# Patient Record
Sex: FEMALE | Race: WHITE | NOT HISPANIC OR LATINO | URBAN - METROPOLITAN AREA
[De-identification: names, ages, dates, MRNs, and addresses within clinical notes are randomized per-mention and may not be internally consistent; named-entity substitution may affect disease eponyms.]

---

## 2017-06-13 ENCOUNTER — APPOINTMENT (RX ONLY)
Dept: URBAN - METROPOLITAN AREA CLINIC 134 | Facility: CLINIC | Age: 42
Setting detail: DERMATOLOGY
End: 2017-06-13

## 2017-06-13 DIAGNOSIS — L82.0 INFLAMED SEBORRHEIC KERATOSIS: ICD-10-CM

## 2017-06-13 PROBLEM — D23.39 OTHER BENIGN NEOPLASM OF SKIN OF OTHER PARTS OF FACE: Status: ACTIVE | Noted: 2017-06-13

## 2017-06-13 PROCEDURE — 17110 DESTRUCTION B9 LES UP TO 14: CPT

## 2017-06-13 PROCEDURE — ? LIQUID NITROGEN

## 2017-06-13 PROCEDURE — 99212 OFFICE O/P EST SF 10 MIN: CPT | Mod: 25

## 2017-06-13 ASSESSMENT — LOCATION SIMPLE DESCRIPTION DERM: LOCATION SIMPLE: LEFT FOREHEAD

## 2017-06-13 ASSESSMENT — LOCATION ZONE DERM: LOCATION ZONE: FACE

## 2017-06-13 ASSESSMENT — LOCATION DETAILED DESCRIPTION DERM: LOCATION DETAILED: LEFT LATERAL FOREHEAD

## 2017-06-13 NOTE — PROCEDURE: LIQUID NITROGEN
Render Post-Care Instructions In Note?: no
Number Of Freeze-Thaw Cycles: 3 freeze-thaw cycles
Medical Necessity Clause: This procedure was medically necessary because the lesions that were treated are a virus, contagious and can spread.
Consent: Risk of LN2 discussed to include pain, blistering, infection, scarring, pigment alteration, lack of response to TX.  Pt provides verbal consent to TX
Detail Level: Simple
Duration Of Freeze Thaw-Cycle (Seconds): 5-10
Medical Necessity Information: It is in your best interest to select a reason for this procedure from the list below. All of these items fulfill various CMS LCD requirements except the new and changing color options.

## 2024-01-08 ENCOUNTER — OFFICE VISIT (OUTPATIENT)
Dept: FAMILY MEDICINE CLINIC | Facility: CLINIC | Age: 49
End: 2024-01-08
Payer: COMMERCIAL

## 2024-01-08 VITALS
HEART RATE: 56 BPM | DIASTOLIC BLOOD PRESSURE: 86 MMHG | WEIGHT: 202 LBS | SYSTOLIC BLOOD PRESSURE: 122 MMHG | OXYGEN SATURATION: 96 % | BODY MASS INDEX: 39.66 KG/M2 | HEIGHT: 60 IN

## 2024-01-08 DIAGNOSIS — Z11.59 ENCOUNTER FOR HEPATITIS C SCREENING TEST FOR LOW RISK PATIENT: ICD-10-CM

## 2024-01-08 DIAGNOSIS — Z13.29 THYROID DISORDER SCREENING: ICD-10-CM

## 2024-01-08 DIAGNOSIS — Z13.1 DIABETES MELLITUS SCREENING: ICD-10-CM

## 2024-01-08 DIAGNOSIS — Z13.220 LIPID SCREENING: ICD-10-CM

## 2024-01-08 DIAGNOSIS — R03.0 ELEVATED BLOOD PRESSURE READING WITHOUT DIAGNOSIS OF HYPERTENSION: Primary | ICD-10-CM

## 2024-01-08 PROCEDURE — 90471 IMMUNIZATION ADMIN: CPT | Performed by: PHYSICIAN ASSISTANT

## 2024-01-08 PROCEDURE — 99203 OFFICE O/P NEW LOW 30 MIN: CPT | Performed by: PHYSICIAN ASSISTANT

## 2024-01-08 PROCEDURE — 90686 IIV4 VACC NO PRSV 0.5 ML IM: CPT | Performed by: PHYSICIAN ASSISTANT

## 2024-01-08 NOTE — PROGRESS NOTES
"Chief Complaint  Establish Care (Pt states BP has been high for couple weeks 140/100 )    Subjective          Radha Grimm presents to [facility]  History of Present Illness  Patient in today to establish care and to discuss elevated blood pressure readings that she has noted over past few weeks . Denies chest pain or shortness of breath. She states has been several years since last labs. She states has irregular menses but is  utd on gyn exam. She is due for colonoscopy- declines today but states will consider. Denies changes to bowel habits.       Objective   Vital Signs:   /86   Pulse 56   Ht 152.4 cm (60\")   Wt 91.6 kg (202 lb)   SpO2 96%   BMI 39.45 kg/m²     Body mass index is 39.45 kg/m².    Review of Systems   Constitutional:  Negative for fever.   HENT:  Negative for congestion and sore throat.    Respiratory:  Negative for cough and shortness of breath.    Cardiovascular:  Negative for chest pain.   Gastrointestinal:  Negative for abdominal pain, diarrhea, nausea and vomiting.   Genitourinary:  Negative for dysuria and frequency.   Neurological:  Negative for dizziness and headache.   Psychiatric/Behavioral:  Negative for depressed mood. The patient is not nervous/anxious.        Past History:  Medical History: has no past medical history on file.   Surgical History: has no past surgical history on file.   Family History: family history includes Hypertension in her father.   Social History: reports that she has never smoked. She has never used smokeless tobacco. She reports current alcohol use. She reports that she does not use drugs.    No current outpatient medications on file.  Allergies: Patient has no known allergies.    Physical Exam  Constitutional:       Appearance: Normal appearance.   HENT:      Right Ear: Tympanic membrane normal.      Left Ear: Tympanic membrane normal.      Mouth/Throat:      Pharynx: Oropharynx is clear.   Eyes:      " Conjunctiva/sclera: Conjunctivae normal.      Pupils: Pupils are equal, round, and reactive to light.   Cardiovascular:      Rate and Rhythm: Normal rate and regular rhythm.      Heart sounds: Normal heart sounds.   Pulmonary:      Effort: Pulmonary effort is normal.      Breath sounds: Normal breath sounds.   Abdominal:      Palpations: Abdomen is soft.      Tenderness: There is no abdominal tenderness.   Neurological:      Mental Status: She is oriented to person, place, and time.   Psychiatric:         Mood and Affect: Mood normal.         Behavior: Behavior normal.             Assessment and Plan   Diagnoses and all orders for this visit:    1. Elevated blood pressure reading without diagnosis of hypertension (Primary)  -     CBC & Differential  Encourage good hydration along with healthy diet and exercise. Recommend to keep a blood pressure log for 2 weeks, returning prior if staying elevated; she is also going to bring in her bp cuff since her home readings higher than in office  2. Encounter for hepatitis C screening test for low risk patient  -     Hepatitis C Antibody    3. Diabetes mellitus screening  -     Comprehensive Metabolic Panel  -     Hemoglobin A1c  Will check hga1c when comes back in fasting for  labs.   4. Lipid screening  -     Lipid Panel  Will check fasting lipid panel with labs.    5. Thyroid disorder screening  -     TSH  Will check TSH with labs.   Other orders  -     Cancel: CBC & Differential        I spent 30 minutes caring for Radha on this date of service. This time includes time spent by me in the following activities:obtaining and/or reviewing a separately obtained history and performing a medically appropriate examination and/or evaluation     Follow Up   No follow-ups on file.  Patient was given instructions and counseling regarding her condition or for health maintenance advice. Please see specific information pulled into the AVS if appropriate.     Aby Pena PA-C

## 2024-01-11 ENCOUNTER — LAB (OUTPATIENT)
Dept: FAMILY MEDICINE CLINIC | Facility: CLINIC | Age: 49
End: 2024-01-11
Payer: COMMERCIAL

## 2024-01-11 ENCOUNTER — CLINICAL SUPPORT (OUTPATIENT)
Dept: FAMILY MEDICINE CLINIC | Facility: CLINIC | Age: 49
End: 2024-01-11
Payer: COMMERCIAL

## 2024-01-11 DIAGNOSIS — I10 HYPERTENSION, UNSPECIFIED TYPE: Primary | ICD-10-CM

## 2024-01-11 NOTE — PROGRESS NOTES
142/92 at 8:45am manual.  144/98 on her machine.   Pt instructed to keep daily log of bp. Come back for a follow up appt in 2 weeks.  Pt instructed to calibrate her machine

## 2024-01-12 LAB
ALBUMIN SERPL-MCNC: 4.4 G/DL (ref 3.9–4.9)
ALBUMIN/GLOB SERPL: 1.9 {RATIO} (ref 1.2–2.2)
ALP SERPL-CCNC: 68 IU/L (ref 44–121)
ALT SERPL-CCNC: 12 IU/L (ref 0–32)
AST SERPL-CCNC: 18 IU/L (ref 0–40)
BILIRUB SERPL-MCNC: 0.4 MG/DL (ref 0–1.2)
BUN SERPL-MCNC: 14 MG/DL (ref 6–24)
BUN/CREAT SERPL: 18 (ref 9–23)
CALCIUM SERPL-MCNC: 9.3 MG/DL (ref 8.7–10.2)
CHLORIDE SERPL-SCNC: 103 MMOL/L (ref 96–106)
CHOLEST SERPL-MCNC: 182 MG/DL (ref 100–199)
CO2 SERPL-SCNC: 25 MMOL/L (ref 20–29)
CREAT SERPL-MCNC: 0.79 MG/DL (ref 0.57–1)
EGFRCR SERPLBLD CKD-EPI 2021: 93 ML/MIN/1.73
GLOBULIN SER CALC-MCNC: 2.3 G/DL (ref 1.5–4.5)
GLUCOSE SERPL-MCNC: 89 MG/DL (ref 70–99)
HBA1C MFR BLD: 5.6 % (ref 4.8–5.6)
HCV IGG SERPL QL IA: NON REACTIVE
HDLC SERPL-MCNC: 50 MG/DL
LDLC SERPL CALC-MCNC: 120 MG/DL (ref 0–99)
POTASSIUM SERPL-SCNC: 4.5 MMOL/L (ref 3.5–5.2)
PROT SERPL-MCNC: 6.7 G/DL (ref 6–8.5)
SODIUM SERPL-SCNC: 142 MMOL/L (ref 134–144)
TRIGL SERPL-MCNC: 63 MG/DL (ref 0–149)
TSH SERPL DL<=0.005 MIU/L-ACNC: 1.89 UIU/ML (ref 0.45–4.5)
VLDLC SERPL CALC-MCNC: 12 MG/DL (ref 5–40)

## 2024-01-22 ENCOUNTER — OFFICE VISIT (OUTPATIENT)
Dept: FAMILY MEDICINE CLINIC | Facility: CLINIC | Age: 49
End: 2024-01-22
Payer: COMMERCIAL

## 2024-01-22 VITALS
OXYGEN SATURATION: 96 % | WEIGHT: 203 LBS | DIASTOLIC BLOOD PRESSURE: 90 MMHG | HEIGHT: 60 IN | HEART RATE: 67 BPM | BODY MASS INDEX: 39.85 KG/M2 | SYSTOLIC BLOOD PRESSURE: 126 MMHG

## 2024-01-22 DIAGNOSIS — R03.0 ELEVATED BLOOD PRESSURE READING WITHOUT DIAGNOSIS OF HYPERTENSION: Primary | ICD-10-CM

## 2024-01-22 PROCEDURE — 99213 OFFICE O/P EST LOW 20 MIN: CPT | Performed by: PHYSICIAN ASSISTANT

## 2024-01-22 NOTE — PROGRESS NOTES
"Chief Complaint  Hypertension (Check up pt states BP has been running high since end of December )    Subjective          Radha Grimm presents to [facility]  History of Present Illness  Patient in today for blood pressure check. She has continued to monitor at home and has been getting elevated readings at times in 140s/low 100s--- states has been checking in early am for past several weeks. Denies chest pain or shortness of breath. She states gets exercise daily and trying to work on healthy diet.   Hypertension  This is a recurrent problem. The current episode started 1 to 4 weeks ago. The problem is unchanged. Pertinent negatives include no anxiety, blurred vision, chest pain, headaches, malaise/fatigue, orthopnea, palpitations, peripheral edema or shortness of breath. There are no associated agents to hypertension. There are no compliance problems.        Objective   Vital Signs:   /90   Pulse 67   Ht 152.4 cm (60\")   Wt 92.1 kg (203 lb)   SpO2 96%   BMI 39.65 kg/m²     Body mass index is 39.65 kg/m².    Review of Systems   Constitutional:  Negative for fever and malaise/fatigue.   Eyes:  Negative for blurred vision.   Respiratory:  Negative for shortness of breath.    Cardiovascular:  Negative for chest pain, palpitations and orthopnea.   Neurological:  Negative for dizziness.       Past History:  Medical History: has no past medical history on file.   Surgical History: has no past surgical history on file.   Family History: family history includes Hypertension in her father.   Social History: reports that she has never smoked. She has never used smokeless tobacco. She reports current alcohol use. She reports that she does not use drugs.    No current outpatient medications on file.  Allergies: Patient has no known allergies.    Physical Exam  Constitutional:       Appearance: Normal appearance.   HENT:      Right Ear: Tympanic membrane normal.      Left Ear: " Tympanic membrane normal.      Mouth/Throat:      Pharynx: Oropharynx is clear.   Eyes:      Conjunctiva/sclera: Conjunctivae normal.      Pupils: Pupils are equal, round, and reactive to light.   Cardiovascular:      Rate and Rhythm: Normal rate and regular rhythm.      Heart sounds: Normal heart sounds.   Pulmonary:      Effort: Pulmonary effort is normal.      Breath sounds: Normal breath sounds.   Abdominal:      Tenderness: There is no abdominal tenderness.   Neurological:      Mental Status: She is oriented to person, place, and time.   Psychiatric:         Mood and Affect: Mood normal.         Behavior: Behavior normal.             Assessment and Plan   Diagnoses and all orders for this visit:    1. Elevated blood pressure reading without diagnosis of hypertension (Primary)  Will have her monitor blood pressure for next week- checking it twice a day and send in blood pressure readings- if remains elevated will start on low dosage of medication; continue to work on healthy diet and exercise           Follow Up   No follow-ups on file.  Patient was given instructions and counseling regarding her condition or for health maintenance advice. Please see specific information pulled into the AVS if appropriate.     Aby Pena PA-C

## 2024-02-01 ENCOUNTER — TELEPHONE (OUTPATIENT)
Dept: FAMILY MEDICINE CLINIC | Facility: CLINIC | Age: 49
End: 2024-02-01
Payer: COMMERCIAL

## 2024-02-01 RX ORDER — NIFEDIPINE 30 MG/1
30 TABLET, EXTENDED RELEASE ORAL DAILY
Qty: 30 TABLET | Refills: 0 | Status: SHIPPED | OUTPATIENT
Start: 2024-02-01

## 2024-02-01 NOTE — TELEPHONE ENCOUNTER
Please let her know with persistent elevated blood pressure will send in low dosage of blood pressure medication called nifedipine- continue to monitor blood pressure and recheck in office in 2 weeks with readings, prior as needed; thanks

## 2024-02-01 NOTE — TELEPHONE ENCOUNTER
Pt contacted, she will get medication and keep log. I accidentally scheduled her in a physical spot at 830 so I extended her out 30 mins. If you dont want me to take that spot, let me know and I will call and reschedule her. There were no plain OV spots open that day

## 2024-02-14 ENCOUNTER — OFFICE VISIT (OUTPATIENT)
Dept: FAMILY MEDICINE CLINIC | Facility: CLINIC | Age: 49
End: 2024-02-14
Payer: COMMERCIAL

## 2024-02-14 VITALS
HEART RATE: 67 BPM | SYSTOLIC BLOOD PRESSURE: 130 MMHG | WEIGHT: 204 LBS | BODY MASS INDEX: 40.05 KG/M2 | DIASTOLIC BLOOD PRESSURE: 80 MMHG | OXYGEN SATURATION: 98 % | HEIGHT: 60 IN

## 2024-02-14 DIAGNOSIS — I10 ESSENTIAL HYPERTENSION: Primary | ICD-10-CM

## 2024-02-14 DIAGNOSIS — R06.02 SHORTNESS OF BREATH ON EXERTION: ICD-10-CM

## 2024-02-14 PROCEDURE — 99214 OFFICE O/P EST MOD 30 MIN: CPT | Performed by: PHYSICIAN ASSISTANT

## 2024-02-14 RX ORDER — NIFEDIPINE 60 MG/1
60 TABLET, EXTENDED RELEASE ORAL DAILY
Qty: 30 TABLET | Refills: 0 | Status: SHIPPED | OUTPATIENT
Start: 2024-02-14

## 2024-02-15 PROCEDURE — 93000 ELECTROCARDIOGRAM COMPLETE: CPT | Performed by: PHYSICIAN ASSISTANT

## 2024-02-20 ENCOUNTER — OFFICE VISIT (OUTPATIENT)
Dept: CARDIOLOGY | Facility: CLINIC | Age: 49
End: 2024-02-20
Payer: COMMERCIAL

## 2024-02-20 VITALS
OXYGEN SATURATION: 97 % | BODY MASS INDEX: 39.85 KG/M2 | SYSTOLIC BLOOD PRESSURE: 130 MMHG | DIASTOLIC BLOOD PRESSURE: 101 MMHG | HEIGHT: 60 IN | HEART RATE: 74 BPM | WEIGHT: 203 LBS

## 2024-02-20 DIAGNOSIS — I10 ESSENTIAL HYPERTENSION: Primary | ICD-10-CM

## 2024-02-20 DIAGNOSIS — E78.00 PRIMARY HYPERCHOLESTEROLEMIA: ICD-10-CM

## 2024-02-20 DIAGNOSIS — R00.2 PALPITATIONS: ICD-10-CM

## 2024-02-20 DIAGNOSIS — R94.31 ABNORMAL EKG: ICD-10-CM

## 2024-02-20 PROCEDURE — 93000 ELECTROCARDIOGRAM COMPLETE: CPT | Performed by: INTERNAL MEDICINE

## 2024-02-20 PROCEDURE — 99204 OFFICE O/P NEW MOD 45 MIN: CPT | Performed by: INTERNAL MEDICINE

## 2024-02-20 RX ORDER — HYDROCHLOROTHIAZIDE 12.5 MG/1
12.5 CAPSULE, GELATIN COATED ORAL DAILY
Qty: 90 CAPSULE | Refills: 3 | Status: SHIPPED | OUTPATIENT
Start: 2024-02-20

## 2024-02-20 NOTE — ASSESSMENT & PLAN NOTE
Lipid abnormalities are newly identified    Plan:  Lipid lowering therapy not prescribed due to patient age, low ASCVD score and trial of TLC first.    Discussed medication dosage, use, side effects, and goals of treatment in detail.    Counseled patient on lifestyle modifications to help control hyperlipidemia.   Cholesterol lowering dietary information shared with patient.  Patient counseled in regards to heart healthy, low fat/ low cholesterol/low sat diet, daily exercise for 30 minutes, low to moderate intensity, and weight loss.  Patient Treatment Goals:   LDL goal is under 100    Followup in 1 year.

## 2024-02-20 NOTE — ASSESSMENT & PLAN NOTE
Hypertension is uncontrolled  Medication changes per orders.  Dietary sodium restriction.  Weight loss.  Regular aerobic exercise.  Ambulatory blood pressure monitoring.  Start hydrochlorothiazide 12.5 mg daily for blood pressure control.  Patient counseled in regards to heart healthy, low fat/ low cholesterol/low sat diet, daily exercise for 30 minutes, low to moderate intensity, and weight loss.  Blood pressure will be reassessedin 4 weeks with primary care, 3 months with cardiology.

## 2024-02-20 NOTE — PROGRESS NOTES
"[facility]  [facility] CARDIOLOGY  [address] [place] [address]  Dept: [phone]  Dept Fax: [phone]    Date: 02/20/2024  Patient: Radha Grimm  YOB: 1975    New Patient Office Note    Consult Reason:  Ms. Radha Grimm is a 48 y.o. female who presents to the clinic to establish care, seen for Shortness of Breath and Hypertension.   Patient short of breath on moderate daily activities, does not have to stop the activity.  Patient denies angina, orthopnea, PND, palpitations, lightheadedness, syncope or medications side-effects..    The following portions of the patient's history were reviewed and updated as appropriate: allergies, current medications, past family history, past medical history, past social history, past surgical history, and problem list.    Medications: No Known Allergies   Current Outpatient Medications   Medication Instructions    hydroCHLOROthiazide (MICROZIDE) 12.5 mg, Oral, Daily    NIFEdipine XL (PROCARDIA XL) 60 mg, Oral, Daily       Subjective  History reviewed. No pertinent past medical history.    History reviewed. No pertinent surgical history.    Family History   Problem Relation Age of Onset    Hypertension Father         Social History     Socioeconomic History    Marital status:    Tobacco Use    Smoking status: Never    Smokeless tobacco: Never   Vaping Use    Vaping Use: Never used   Substance and Sexual Activity    Alcohol use: Yes     Comment: occ    Drug use: Never    Sexual activity: Defer       Objective  Vitals:    02/20/24 0948   BP: (!) 130/101   BP Location: Left arm   Patient Position: Sitting   Cuff Size: Adult   Pulse: 74   SpO2: 97%   Weight: 92.1 kg (203 lb)   Height: 152.4 cm (60\")   PainSc: 0-No pain     Vitals:    02/20/24 0948   BP: (!) 130/101   BP Location: Left arm   Patient Position: Sitting   Cuff Size: Adult   Pulse: 74   SpO2: 97%   Weight: 92.1 kg (203 lb)   Height: 152.4 cm (60\")    " "    Physical Exam  Constitutional:       Appearance: Healthy appearance. Not in distress.   Eyes:      Pupils: Pupils are equal, round, and reactive to light.   HENT:    Mouth/Throat:      Mouth: Mucous membranes are moist.   Neck:      Vascular: No carotid bruit, hepatojugular reflux, JVD or JVR. JVD normal.   Pulmonary:      Effort: Pulmonary effort is normal.      Breath sounds: Normal breath sounds. No wheezing. No rhonchi. No rales.   Chest:      Chest wall: Not tender to palpatation.   Cardiovascular:      PMI at left midclavicular line. Normal rate. Regular rhythm. Normal S1 with normal intensity. Normal S2 with normal intensity.       Murmurs: There is no murmur.      No gallop.  No click. No rub.   Pulses:     Carotid: 4+ bilaterally.     Radial: 4+ bilaterally.     Popliteal: 4+ bilaterally.     Dorsalis pedis: 4+ bilaterally.  Edema:     Peripheral edema absent.   Abdominal:      General: There is no abdominal bruit.   Skin:     General: Skin is warm.   Neurological:      Mental Status: Alert and oriented to person, place and time.          Labs:  Lab Results   Component Value Date     01/11/2024    K 4.5 01/11/2024     01/11/2024    CO2 25 01/11/2024    BUN 14 01/11/2024    CREATININE 0.79 01/11/2024    CALCIUM 9.3 01/11/2024    BILITOT 0.4 01/11/2024    ALKPHOS 68 01/11/2024    ALT 12 01/11/2024    AST 18 01/11/2024    GLUCOSE 89 01/11/2024    ALBUMIN 4.4 01/11/2024     No results found for: \"WBC\", \"HGB\", \"HCT\", \"PLT\"  No results found for: \"APTT\", \"INR\", \"PTT\"  No results found for: \"CKTOTAL\", \"TROPONINI\", \"TROPONINT\", \"CKMBINDEX\", \"BNP\"  No results found for: \"BNP\", \"PROBNP\"    Lab Results   Component Value Date    CHLPL 182 01/11/2024    TRIG 63 01/11/2024    HDL 50 01/11/2024     (H) 01/11/2024     Lab Results   Component Value Date    TSH 1.890 01/11/2024       The 10-year ASCVD risk score (Vlad ELDRIDGE, et al., 2019) is: 1.3%    Values used to calculate the score:      Age: 48 " years      Sex: Female      Is Non- : No      Diabetic: No      Tobacco smoker: No      Systolic Blood Pressure: 130 mmHg      Is BP treated: Yes      HDL Cholesterol: 50 mg/dL      Total Cholesterol: 182 mg/dL     [facility] Diagnostics:    ECG 12 Lead    Date/Time: 2/20/2024 9:49 AM  Performed by: Osmar Grullon MD    Authorized by: Osmar Grullon MD  Comparison: compared with previous ECG from 2/14/2024  Similar to previous ECG  Rhythm: sinus bradycardia  Other findings: left atrial abnormality          CXR: No results found for this or any previous visit.     ECHO/MUGA: No results found for this or any previous visit.     STRESS TESTS: No results found for this or any previous visit.     CARDIAC CATH: No results found for this or any previous visit.     DEVICES: No valid procedures specified.   HOLTER: No results found for this or any previous visit.     CT/MRI:  No results found for this or any previous visit.    VASCULAR: No valid procedures specified.     Assessment and Plan  Diagnoses and all orders for this visit:    1. Essential hypertension (Primary)  Assessment & Plan:  Hypertension is uncontrolled  Medication changes per orders.  Dietary sodium restriction.  Weight loss.  Regular aerobic exercise.  Ambulatory blood pressure monitoring.  Start hydrochlorothiazide 12.5 mg daily for blood pressure control.  Patient counseled in regards to heart healthy, low fat/ low cholesterol/low sat diet, daily exercise for 30 minutes, low to moderate intensity, and weight loss.  Blood pressure will be reassessedin 4 weeks with primary care, 3 months with cardiology.    Orders:  -     Adult Transthoracic Echo Complete W/ Cont if Necessary Per Protocol; Future    2. Primary hypercholesterolemia    3. Abnormal EKG  Assessment & Plan:  Left atrial enlargement on EKG.  Plan to get an echocardiogram to assess left atrial size and cardiac function in the setting of hypertension.    Orders:  -     Adult  Transthoracic Echo Complete W/ Cont if Necessary Per Protocol; Future    4. Palpitations  -     Holter Monitor - 48 Hour    Other orders  -     ECG 12 Lead  -     hydroCHLOROthiazide (MICROZIDE) 12.5 MG capsule; Take 1 capsule by mouth Daily.  Dispense: 90 capsule; Refill: 3         Return in about 3 months (around 5/20/2024) for Follow-up with Dr Grullon.    There are no Patient Instructions on file for this visit.    Osmar Grullon MD

## 2024-02-20 NOTE — ASSESSMENT & PLAN NOTE
Left atrial enlargement on EKG.  Plan to get an echocardiogram to assess left atrial size and cardiac function in the setting of hypertension.

## 2024-02-28 ENCOUNTER — PATIENT ROUNDING (BHMG ONLY) (OUTPATIENT)
Dept: CARDIOLOGY | Facility: CLINIC | Age: 49
End: 2024-02-28
Payer: COMMERCIAL

## 2024-02-28 NOTE — PROGRESS NOTES
February 28, 2024    Hello, may I speak with Radha Grimm?    My name is UNA     I am  with [facility]  [facility]OLOG[facility]  [address] [place] [address].    Before we get started may I verify your date of birth? 1975    I am calling to officially welcome you to our practice and ask about your recent visit. Is this a good time to talk? yes    Tell me about your visit with us. What things went well?  FINE        We're always looking for ways to make our patients' experiences even better. Do you have recommendations on ways we may improve?  no           I appreciate you taking the time to speak with me today. Is there anything else I can do for you? no      Thank you, and have a great day.

## 2024-02-29 ENCOUNTER — OFFICE VISIT (OUTPATIENT)
Dept: FAMILY MEDICINE CLINIC | Facility: CLINIC | Age: 49
End: 2024-02-29
Payer: COMMERCIAL

## 2024-02-29 VITALS
BODY MASS INDEX: 39.07 KG/M2 | HEIGHT: 60 IN | OXYGEN SATURATION: 97 % | WEIGHT: 199 LBS | DIASTOLIC BLOOD PRESSURE: 90 MMHG | HEART RATE: 76 BPM | SYSTOLIC BLOOD PRESSURE: 130 MMHG

## 2024-02-29 DIAGNOSIS — I10 HYPERTENSION, ESSENTIAL: Primary | ICD-10-CM

## 2024-02-29 PROCEDURE — 99214 OFFICE O/P EST MOD 30 MIN: CPT | Performed by: PHYSICIAN ASSISTANT

## 2024-02-29 RX ORDER — NIFEDIPINE 60 MG/1
60 TABLET, EXTENDED RELEASE ORAL DAILY
Qty: 90 TABLET | Refills: 1 | Status: SHIPPED | OUTPATIENT
Start: 2024-02-29

## 2024-02-29 NOTE — PROGRESS NOTES
"Chief Complaint  Hypertension (2 week follow-up)    Subjective          Radha Grimm presents to [facility]  History of Present Illness  Patient in today for recheck on blood pressure. She saw cardiology and the added in hctz 12.5 mg per day and she states her blood pressure has shown improvement. Denies chest pain or shortness of breath. She had holter monitor and is scheduled for echo tomorrow for further evaluation on EKG findings of possible left atrial enlargement. She is due for colonoscopy but wants to defer until she gets done seeing cardiology and will call to get scheduled.   Hypertension  This is a recurrent problem. The current episode started 1 to 4 weeks ago. The problem has been improved since onset. The problem is resistant. Associated symptoms include headaches and malaise/fatigue. Pertinent negatives include no anxiety, blurred vision, chest pain, orthopnea, palpitations, peripheral edema or shortness of breath. Risk factors for coronary artery disease include obesity. There are no compliance problems.        Objective   Vital Signs:   /90   Pulse 76   Ht 152.4 cm (60\")   Wt 90.3 kg (199 lb)   SpO2 97%   BMI 38.86 kg/m²     Body mass index is 38.86 kg/m².    Review of Systems   Constitutional:  Positive for malaise/fatigue.   HENT:  Negative for congestion and sore throat.    Eyes:  Negative for blurred vision.   Respiratory:  Negative for shortness of breath.    Cardiovascular:  Negative for chest pain, palpitations and orthopnea.   Gastrointestinal:  Negative for abdominal pain, diarrhea, nausea and vomiting.   Neurological:  Negative for dizziness and headache.       Past History:  Medical History: has a past medical history of Hypertension (12/23/2023) and Urinary tract infection.   Surgical History: has no past surgical history on file.   Family History: family history includes Hyperlipidemia in her father; Hypertension in her father.   Social " History: reports that she has never smoked. She has never used smokeless tobacco. She reports current alcohol use of about 1.0 standard drink of alcohol per week. She reports that she does not use drugs.      Current Outpatient Medications:     hydroCHLOROthiazide (MICROZIDE) 12.5 MG capsule, Take 1 capsule by mouth Daily., Disp: 90 capsule, Rfl: 3    NIFEdipine XL (PROCARDIA XL) 60 MG 24 hr tablet, Take 1 tablet by mouth Daily., Disp: 30 tablet, Rfl: 0  Allergies: Patient has no known allergies.    Physical Exam  Constitutional:       Appearance: Normal appearance.   HENT:      Right Ear: Tympanic membrane normal.      Left Ear: Tympanic membrane normal.      Mouth/Throat:      Pharynx: Oropharynx is clear.   Eyes:      Conjunctiva/sclera: Conjunctivae normal.      Pupils: Pupils are equal, round, and reactive to light.   Cardiovascular:      Rate and Rhythm: Normal rate and regular rhythm.      Heart sounds: Normal heart sounds.   Pulmonary:      Effort: Pulmonary effort is normal.      Breath sounds: Normal breath sounds.   Abdominal:      Tenderness: There is no abdominal tenderness.   Musculoskeletal:      Right lower leg: No edema.      Left lower leg: No edema.   Neurological:      Mental Status: She is oriented to person, place, and time.   Psychiatric:         Mood and Affect: Mood normal.         Behavior: Behavior normal.             Assessment and Plan   Diagnoses and all orders for this visit:    1. Hypertension, essential (Primary)  Blood pressure has shown improvement; encouraged to continue to monitor once a day as directed by cardiology; continue to work on healthy diet and exercise and to let us know how bp running in next few weeks, prior as needed.           Follow Up   No follow-ups on file.  Patient was given instructions and counseling regarding her condition or for health maintenance advice. Please see specific information pulled into the AVS if appropriate.     Aby Pena PA-C

## 2024-03-01 ENCOUNTER — TELEPHONE (OUTPATIENT)
Dept: CARDIOLOGY | Facility: CLINIC | Age: 49
End: 2024-03-01

## 2024-03-01 NOTE — TELEPHONE ENCOUNTER
----- Message from Osmar Grullon MD sent at 3/1/2024 10:05 AM EST -----  Please inform patient that her test was normal. Thank you!

## 2024-03-01 NOTE — TELEPHONE ENCOUNTER
informed patient that her test was abnormal.  She had 1 fast heart rate on the upper chamber 4 beats, and 1 fast heart rate from the lower chamber 3 beats, both benign in the setting of her normal echocardiogram.  They could cause palpitations, but she did not report any so likely asymptomatic.  If she felt palpitations during Holter monitoring but did not report it, we can always treat with preventative medical therapy.  If truly asymptomatic, does not need to be treated except with blood pressure control.PT verbalized understanding and had no further questions.

## 2024-03-01 NOTE — TELEPHONE ENCOUNTER
----- Message from Osmar Grullon MD sent at 3/1/2024 10:21 AM EST -----  Please inform patient that her test was abnormal.  She had 1 fast heart rate on the upper chamber 4 beats, and 1 fast heart rate from the lower chamber 3 beats, both benign in the setting of her normal echocardiogram.  They could cause palpitations, but she did not report any so likely asymptomatic.  If she felt palpitations during Holter monitoring but did not report it, we can always treat with preventative medical therapy.  If truly asymptomatic, does not need to be treated except with blood pressure control.  Thank you!

## 2024-03-04 RX ORDER — NIFEDIPINE 30 MG/1
30 TABLET, EXTENDED RELEASE ORAL DAILY
Qty: 30 TABLET | Refills: 0 | OUTPATIENT
Start: 2024-03-04

## 2024-03-25 ENCOUNTER — TELEPHONE (OUTPATIENT)
Dept: FAMILY MEDICINE CLINIC | Facility: CLINIC | Age: 49
End: 2024-03-25
Payer: COMMERCIAL

## 2024-03-25 NOTE — TELEPHONE ENCOUNTER
Recommend to keep daily log - prefer to see the lower number less than 90-- so want to make sure it gets to that range after over illness-- if staying elevated to let us know;; thanks

## 2024-05-07 ENCOUNTER — TELEPHONE (OUTPATIENT)
Dept: CARDIOLOGY | Facility: CLINIC | Age: 49
End: 2024-05-07
Payer: COMMERCIAL

## 2024-05-16 ENCOUNTER — OFFICE VISIT (OUTPATIENT)
Dept: CARDIOLOGY | Facility: CLINIC | Age: 49
End: 2024-05-16
Payer: COMMERCIAL

## 2024-05-16 VITALS
SYSTOLIC BLOOD PRESSURE: 122 MMHG | HEART RATE: 74 BPM | OXYGEN SATURATION: 97 % | BODY MASS INDEX: 39.85 KG/M2 | DIASTOLIC BLOOD PRESSURE: 70 MMHG | WEIGHT: 203 LBS | RESPIRATION RATE: 20 BRPM | TEMPERATURE: 97.3 F | HEIGHT: 60 IN

## 2024-05-16 DIAGNOSIS — R00.2 PALPITATIONS: ICD-10-CM

## 2024-05-16 DIAGNOSIS — I10 ESSENTIAL HYPERTENSION: Primary | ICD-10-CM

## 2024-05-16 DIAGNOSIS — E78.00 PRIMARY HYPERCHOLESTEROLEMIA: ICD-10-CM

## 2024-05-16 NOTE — PROGRESS NOTES
Venipuncture Blood Specimen Collection  Venipuncture performed in [facility] by Candace Nunez RN with good hemostasis. Patient tolerated the procedure well without complications.   05/16/24   Candace Nunez RN

## 2024-05-16 NOTE — ASSESSMENT & PLAN NOTE
Holter monitor showing SVT and nonsustained VT.  Workup negative with normal echocardiogram.  Blood pressure to target.  Electrolytes to goal.  Infrequent palpitations.  Patient worried about side effect of medication if needed to be treated.  Patient going on vacation with her son for the next week.  Plan:  Delay medication changes 3 weeks until patient back from vacation  Plan to decrease nifedipine to 30 mg p.o. daily for 1 week and assess blood pressure control  If blood pressure uncontrolled, plan to start low-dose verapamil while nifedipine on board; if blood pressure controlled, plan to replace nifedipine with verapamil.

## 2024-05-16 NOTE — PROGRESS NOTES
"[facility]  [facility] CARDIOLOGY  [address] [place] [address]  Dept: [phone]  Dept Fax: [phone]    Date: 05/16/2024  Patient: Radha Grimm  YOB: 1975    Follow Up Office Visit Note    Interval Follow-up  Ms. Radha Grimm is a 49 y.o. female who is here for follow-up on Hypertension and Palpitations.    Subjective   Patient recovering from a URI, felt as COVID.  Patient with shortness of breath on moderate activities, no chest pain.  Patient felt transiently worse on hydrochlorothiazide, more tired and less energetic, despite good blood pressure control.  Patient got used to the medications and now feeling better.  Infrequent palpitations.  Patient denies angina, orthopnea, PND, lightheadedness, syncope or medications side-effects.    The following portions of the patient's history were reviewed and updated as appropriate: allergies, current medications, past family history, past medical history, past social history, past surgical history, and problem list.    Medications: No Known Allergies   Current Outpatient Medications   Medication Instructions    hydroCHLOROthiazide (MICROZIDE) 12.5 mg, Oral, Daily    NIFEdipine XL (PROCARDIA XL) 60 mg, Oral, Daily       Tobacco Use: Low Risk  (5/16/2024)    Patient History     Smoking Tobacco Use: Never     Smokeless Tobacco Use: Never     Passive Exposure: Not on file        Objective  Vitals:    05/16/24 1021   BP: 122/70   Pulse: 74   Resp: 20   Temp: 97.3 °F (36.3 °C)   SpO2: 97%   Weight: 92.1 kg (203 lb)   Height: 152.4 cm (60\")   PainSc: 0-No pain      Vitals:    05/16/24 1021   BP: 122/70   Pulse: 74   Resp: 20   Temp: 97.3 °F (36.3 °C)   SpO2: 97%   Weight: 92.1 kg (203 lb)   Height: 152.4 cm (60\")          Physical Exam  Constitutional:       Appearance: Not in distress.   Neck:      Vascular: JVD normal.   Pulmonary:      Breath sounds: Normal breath sounds.   Cardiovascular:      Normal rate. " "Regular rhythm.      Murmurs: There is no murmur.   Pulses:     Intact distal pulses.   Edema:     Peripheral edema absent.   Neurological:      Mental Status: Alert.              Diagnostic Data  Lab Results   Component Value Date     01/11/2024    K 4.5 01/11/2024     01/11/2024    CO2 25 01/11/2024    BUN 14 01/11/2024    CREATININE 0.79 01/11/2024    CALCIUM 9.3 01/11/2024    BILITOT 0.4 01/11/2024    ALKPHOS 68 01/11/2024    ALT 12 01/11/2024    AST 18 01/11/2024    GLUCOSE 89 01/11/2024    ALBUMIN 4.4 01/11/2024     No results found for: \"WBC\", \"HGB\", \"HCT\", \"PLT\"  No results found for: \"APTT\", \"INR\", \"PTT\"  No results found for: \"CKTOTAL\", \"TROPONINI\", \"TROPONINT\", \"CKMBINDEX\", \"BNP\"  No results found for: \"BNP\", \"PROBNP\"    Lab Results   Component Value Date    CHLPL 182 01/11/2024    TRIG 63 01/11/2024    HDL 50 01/11/2024     (H) 01/11/2024     Lab Results   Component Value Date    TSH 1.890 01/11/2024       CV Diagnostics:  Procedures    CXR: No results found for this or any previous visit.     ECHO/MUGA: Results for orders placed in visit on 02/29/24    Adult Transthoracic Echo Complete W/ Cont if Necessary Per Protocol    Interpretation Summary    Normal LV size and vigorous function, ejection fraction estimated by 2D, 66 - 70%.    Poorly visualized aortic valve, appears to be trileaflet with mild sclerosis, no stenosis and trace regurgitation    Diastolic function normal for age     STRESS TESTS: No results found for this or any previous visit.     CARDIAC CATH: No results found for this or any previous visit.     DEVICES: No valid procedures specified.   HOLTER: Results for orders placed in visit on 02/20/24    Holter Monitor - 48 Hour    Interpretation Summary    An abnormal monitor study.    There was 1 event of atrial tachycardia 4 beats average rate 115 bpm fastest rate 140 bpm.    There was 1 event of nonsustained ventricular tachycardia 3 beats average rate 164 bpm fastest " rate 190 bpm.    No events reported by patient/no diary provided.    There was no atrial fibrillation, cardiac pauses or heart blocks detected.     CT/MRI:  No results found for this or any previous visit.    VASCULAR: No valid procedures specified.     Assessment and Plan  Diagnoses and all orders for this visit:    1. Essential hypertension (Primary)  Assessment & Plan:  Hypertension is stable and controlled  Continue current treatment regimen.  Dietary sodium restriction.  Weight loss.  Regular aerobic exercise.  Ambulatory blood pressure monitoring.  Blood pressure will be reassessed in 6 months.    Orders:  -     Cancel: Basic Metabolic Panel; Future  -     Basic Metabolic Panel    2. Primary hypercholesterolemia  Assessment & Plan:  Lipid abnormalities are newly stable.     Plan:  Lipid lowering therapy not prescribed due to patient age, low ASCVD score and trial of TLC first.     Discussed medication dosage, use, side effects, and goals of treatment in detail.    Counseled patient on lifestyle modifications to help control hyperlipidemia.   Cholesterol lowering dietary information shared with patient.  Patient counseled in regards to heart healthy, low fat/ low cholesterol/low sat diet, daily exercise for 30 minutes, low to moderate intensity, and weight loss.    Lab Results   Component Value Date    CHLPL 182 01/11/2024    TRIG 63 01/11/2024    HDL 50 01/11/2024     (H) 01/11/2024     Goal of therapy: LDL  mg/dL    Follow up lipid profile next visit      3. Palpitations  Assessment & Plan:  Holter monitor showing SVT and nonsustained VT.  Workup negative with normal echocardiogram.  Blood pressure to target.  Electrolytes to goal.  Infrequent palpitations.  Patient worried about side effect of medication if needed to be treated.  Patient going on vacation with her son for the next week.  Plan:  Delay medication changes 3 weeks until patient back from vacation  Plan to decrease nifedipine to 30  mg p.o. daily for 1 week and assess blood pressure control  If blood pressure uncontrolled, plan to start low-dose verapamil while nifedipine on board; if blood pressure controlled, plan to replace nifedipine with verapamil.           No follow-ups on file.    There are no Patient Instructions on file for this visit.    Osmar Grullon MD

## 2024-05-16 NOTE — ASSESSMENT & PLAN NOTE
Lipid abnormalities are newly stable.     Plan:  Lipid lowering therapy not prescribed due to patient age, low ASCVD score and trial of TLC first.     Discussed medication dosage, use, side effects, and goals of treatment in detail.    Counseled patient on lifestyle modifications to help control hyperlipidemia.   Cholesterol lowering dietary information shared with patient.  Patient counseled in regards to heart healthy, low fat/ low cholesterol/low sat diet, daily exercise for 30 minutes, low to moderate intensity, and weight loss.    Lab Results   Component Value Date    CHLPL 182 01/11/2024    TRIG 63 01/11/2024    HDL 50 01/11/2024     (H) 01/11/2024     Goal of therapy: LDL  mg/dL    Follow up lipid profile next visit

## 2024-05-17 LAB
BUN SERPL-MCNC: 15 MG/DL (ref 6–24)
BUN/CREAT SERPL: 20 (ref 9–23)
CALCIUM SERPL-MCNC: 9.7 MG/DL (ref 8.7–10.2)
CHLORIDE SERPL-SCNC: 99 MMOL/L (ref 96–106)
CO2 SERPL-SCNC: 23 MMOL/L (ref 20–29)
CREAT SERPL-MCNC: 0.75 MG/DL (ref 0.57–1)
EGFRCR SERPLBLD CKD-EPI 2021: 98 ML/MIN/1.73
GLUCOSE SERPL-MCNC: 89 MG/DL (ref 70–99)
POTASSIUM SERPL-SCNC: 3.8 MMOL/L (ref 3.5–5.2)
SODIUM SERPL-SCNC: 138 MMOL/L (ref 134–144)

## 2024-06-13 ENCOUNTER — TELEPHONE (OUTPATIENT)
Dept: CARDIOLOGY | Facility: CLINIC | Age: 49
End: 2024-06-13
Payer: COMMERCIAL

## 2024-06-13 RX ORDER — NIFEDIPINE 30 MG
30 TABLET, EXTENDED RELEASE ORAL DAILY
Qty: 30 TABLET | Refills: 1 | Status: SHIPPED | OUTPATIENT
Start: 2024-06-13

## 2024-06-13 NOTE — TELEPHONE ENCOUNTER
----- Message from Osmar Grullon sent at 5/16/2024 10:48 AM EDT -----  Original Date:  5/16/2024  10:48 EDT  Test needed: Check BP  Reason: Call patient to transition BP meds.

## 2024-06-13 NOTE — TELEPHONE ENCOUNTER
I discussed with patient over the phone blood pressure medications changes in order to start a nondihydropyridine calcium channel blockers for her SVTs.  Plan to discontinue nifedipine 60 mg p.o. daily, continue hydrochlorothiazide 12.5 mg daily, start nifedipine 30 mg p.o. daily and monitor blood pressure over the weekend.  If blood pressure elevated to add verapamil extended release 120 mg p.o. daily.  If blood pressure unchanged stable, we will be substituting nifedipine 30 with verapamil 120 mg p.o. daily.  Patient understands the plan and agreeable, and had no questions to ask.

## 2024-06-21 ENCOUNTER — TELEPHONE (OUTPATIENT)
Dept: CARDIOLOGY | Facility: CLINIC | Age: 49
End: 2024-06-21
Payer: COMMERCIAL

## 2024-06-21 NOTE — TELEPHONE ENCOUNTER
----- Message from Osmar Grullon sent at 6/20/2024  4:15 PM EDT -----  Can you please asked the patient to give us her blood pressure readings for the last 2 to 3 weeks?  Thank you!  ----- Message -----  From: Osmar Grullon MD  Sent: 6/6/2024  12:00 AM EDT  To: Osmar Grullon MD    Original Date:  5/16/2024  10:48 EDT  Test needed: Check BP  Reason: Call patient to transition BP meds.

## 2024-06-21 NOTE — TELEPHONE ENCOUNTER
LVM to call back. How has she been doing with medication changes? What has her pressure been running since the last time she spoke with Dr Grullon?  HUB MAY RELAY MESSAGE

## 2024-06-24 NOTE — TELEPHONE ENCOUNTER
How has she been doing with medication changes?  What has her pressure been running since the last time she spoke with Dr Grullon?         B/p 128/82 today she said she taking all medication . Pt is going to load all b/p in my chart .

## 2024-08-12 RX ORDER — NIFEDIPINE 30 MG
30 TABLET, EXTENDED RELEASE ORAL DAILY
Qty: 30 TABLET | Refills: 3 | Status: SHIPPED | OUTPATIENT
Start: 2024-08-12 | End: 2024-08-16

## 2024-08-12 RX ORDER — NIFEDIPINE 30 MG
30 TABLET, EXTENDED RELEASE ORAL DAILY
Qty: 30 TABLET | Refills: 3 | Status: SHIPPED | OUTPATIENT
Start: 2024-08-12 | End: 2024-08-12 | Stop reason: SDUPTHER

## 2024-08-16 ENCOUNTER — OFFICE VISIT (OUTPATIENT)
Dept: CARDIOLOGY | Facility: CLINIC | Age: 49
End: 2024-08-16
Payer: COMMERCIAL

## 2024-08-16 VITALS
RESPIRATION RATE: 18 BRPM | SYSTOLIC BLOOD PRESSURE: 130 MMHG | HEART RATE: 69 BPM | HEIGHT: 60 IN | OXYGEN SATURATION: 98 % | WEIGHT: 208.4 LBS | DIASTOLIC BLOOD PRESSURE: 72 MMHG | BODY MASS INDEX: 40.91 KG/M2

## 2024-08-16 DIAGNOSIS — E78.00 PRIMARY HYPERCHOLESTEROLEMIA: ICD-10-CM

## 2024-08-16 DIAGNOSIS — R00.2 PALPITATIONS: ICD-10-CM

## 2024-08-16 DIAGNOSIS — I10 ESSENTIAL HYPERTENSION: Primary | ICD-10-CM

## 2024-08-16 PROCEDURE — 93000 ELECTROCARDIOGRAM COMPLETE: CPT | Performed by: INTERNAL MEDICINE

## 2024-08-16 PROCEDURE — 99214 OFFICE O/P EST MOD 30 MIN: CPT | Performed by: INTERNAL MEDICINE

## 2024-08-16 NOTE — ASSESSMENT & PLAN NOTE
Hypertension is stable and controlled  Discontinue nifedipine and uptitrate verapamil to 180 mg p.o. daily.  Ambulatory blood pressure monitoring at home.  Dietary sodium restriction.  Weight loss.  Regular aerobic exercise.  Blood pressure will be reassessed in 6 months.

## 2024-08-16 NOTE — ASSESSMENT & PLAN NOTE
Holter monitor showing SVT and nonsustained VT.  Workup negative with normal echocardiogram.  Blood pressure to target.  Electrolytes to goal.  Infrequent palpitations, improved from prior.  BP well-controlled.  Plan:  Uptitrate verapamil to 180 mg p.o. daily   Discontinue nifedipine 30 mg p.o. and monitor blood pressure at home  If blood pressure uncontrolled, plan to start low-dose ARB   If no change in symptoms, consider repeat Holter monitor and antiarrhythmic therapy versus cardiac ablation as discussed

## 2024-08-16 NOTE — ASSESSMENT & PLAN NOTE
Lipid abnormalities are newly stable.     Plan:  Lipid lowering therapy not prescribed due to patient age, low ASCVD score and trial of TLC first.     Discussed medication dosage, use, side effects, and goals of treatment in detail.    Counseled patient on lifestyle modifications to help control hyperlipidemia.   Cholesterol lowering dietary information shared with patient.  Patient counseled in regards to heart healthy, low fat/ low cholesterol/low sat diet, daily exercise for 30 minutes, low to moderate intensity, and weight loss.           Lab Results   Component Value Date     CHLPL 182 01/11/2024     TRIG 63 01/11/2024     HDL 50 01/11/2024      (H) 01/11/2024      Goal of therapy: LDL  mg/dL     Follow up lipid profile next visit, fasting.

## 2024-08-16 NOTE — PROGRESS NOTES
"[facility]  [facility] CARDIOLOGY  [address] [place] [address]  Dept: [phone]  Dept Fax: [phone]    Date: 08/16/2024  Patient: Radha Grimm  YOB: 1975    Follow Up Office Visit Note    Interval Follow-up  Ms. Radha Grimm is a 49 y.o. female who is here for follow-up on Rapid Heart Rate and Hypertension.    Subjective   Patient overall doing better.  Still complaining of palpitations.  Patient denies angina, orthopnea, PND, lightheadedness, syncope or medications side-effects.      The following portions of the patient's history were reviewed and updated as appropriate: allergies, current medications, past family history, past medical history, past social history, past surgical history, and problem list.    Medications: No Known Allergies   Current Outpatient Medications   Medication Instructions    hydroCHLOROthiazide (MICROZIDE) 12.5 mg, Oral, Daily    verapamil SR (CALAN-SR) 180 mg, Oral, Nightly       Tobacco Use: Low Risk  (8/16/2024)    Patient History     Smoking Tobacco Use: Never     Smokeless Tobacco Use: Never     Passive Exposure: Not on file        Objective  Vitals:    08/16/24 1036   BP: 130/72   Pulse: 69   Resp: 18   SpO2: 98%   Weight: 94.5 kg (208 lb 6.4 oz)   Height: 152.4 cm (60\")   PainSc: 0-No pain      Vitals:    08/16/24 1036   BP: 130/72   Pulse: 69   Resp: 18   SpO2: 98%   Weight: 94.5 kg (208 lb 6.4 oz)   Height: 152.4 cm (60\")          Physical Exam  Constitutional:       Appearance: Not in distress.   Neck:      Vascular: JVD normal.   Pulmonary:      Breath sounds: Normal breath sounds.   Cardiovascular:      Normal rate. Regular rhythm.      Murmurs: There is no murmur.   Pulses:     Intact distal pulses.   Edema:     Peripheral edema absent.   Neurological:      Mental Status: Alert.              Diagnostic Data  Lab Results   Component Value Date     05/16/2024    K 3.8 05/16/2024    CL 99 05/16/2024    CO2 23 " "05/16/2024    BUN 15 05/16/2024    CREATININE 0.75 05/16/2024    CALCIUM 9.7 05/16/2024    BILITOT 0.4 01/11/2024    ALKPHOS 68 01/11/2024    ALT 12 01/11/2024    AST 18 01/11/2024    GLUCOSE 89 05/16/2024    ALBUMIN 4.4 01/11/2024     No results found for: \"WBC\", \"HGB\", \"HCT\", \"PLT\"  No results found for: \"APTT\", \"INR\", \"PTT\"  No results found for: \"CKTOTAL\", \"TROPONINI\", \"TROPONINT\", \"CKMBINDEX\", \"BNP\"  No results found for: \"BNP\", \"PROBNP\"    Lab Results   Component Value Date    CHLPL 182 01/11/2024    TRIG 63 01/11/2024    HDL 50 01/11/2024     (H) 01/11/2024     Lab Results   Component Value Date    TSH 1.890 01/11/2024       CV Diagnostics:    ECG 12 Lead    Date/Time: 8/16/2024 11:01 AM  Performed by: Osmar Grullon MD    Authorized by: Osmar Grullon MD  Comparison: compared with previous ECG from 2/20/2024  Comparison to previous ECG: Left atrial anomaly not identified on current EKG  New onset low voltage QRS  Rhythm: sinus bradycardia  Other findings: low voltage  Comments: Sinus bradycardia with low voltage QRS          CXR: No results found for this or any previous visit.     ECHO/MUGA: Results for orders placed in visit on 02/29/24    Adult Transthoracic Echo Complete W/ Cont if Necessary Per Protocol    Interpretation Summary    Normal LV size and vigorous function, ejection fraction estimated by 2D, 66 - 70%.    Poorly visualized aortic valve, appears to be trileaflet with mild sclerosis, no stenosis and trace regurgitation    Diastolic function normal for age     STRESS TESTS: No results found for this or any previous visit.     CARDIAC CATH: No results found for this or any previous visit.     DEVICES: No valid procedures specified.   HOLTER: Results for orders placed in visit on 02/20/24    Holter Monitor - 48 Hour    Interpretation Summary    An abnormal monitor study.    There was 1 event of atrial tachycardia 4 beats average rate 115 bpm fastest rate 140 bpm.    There was 1 event of " nonsustained ventricular tachycardia 3 beats average rate 164 bpm fastest rate 190 bpm.    No events reported by patient/no diary provided.    There was no atrial fibrillation, cardiac pauses or heart blocks detected.     CT/MRI:  No results found for this or any previous visit.    VASCULAR: No valid procedures specified.     Assessment and Plan  Diagnoses and all orders for this visit:    1. Essential hypertension (Primary)  Assessment & Plan:  Hypertension is stable and controlled  Discontinue nifedipine and uptitrate verapamil to 180 mg p.o. daily.  Ambulatory blood pressure monitoring at home.  Dietary sodium restriction.  Weight loss.  Regular aerobic exercise.  Blood pressure will be reassessed in 6 months.    Orders:  -     ECG 12 Lead    2. Primary hypercholesterolemia  Assessment & Plan:   Lipid abnormalities are newly stable.     Plan:  Lipid lowering therapy not prescribed due to patient age, low ASCVD score and trial of TLC first.     Discussed medication dosage, use, side effects, and goals of treatment in detail.    Counseled patient on lifestyle modifications to help control hyperlipidemia.   Cholesterol lowering dietary information shared with patient.  Patient counseled in regards to heart healthy, low fat/ low cholesterol/low sat diet, daily exercise for 30 minutes, low to moderate intensity, and weight loss.           Lab Results   Component Value Date     CHLPL 182 01/11/2024     TRIG 63 01/11/2024     HDL 50 01/11/2024      (H) 01/11/2024      Goal of therapy: LDL  mg/dL     Follow up lipid profile next visit, fasting.      3. Palpitations  Assessment & Plan:  Holter monitor showing SVT and nonsustained VT.  Workup negative with normal echocardiogram.  Blood pressure to target.  Electrolytes to goal.  Infrequent palpitations, improved from prior.  BP well-controlled.  Plan:  Uptitrate verapamil to 180 mg p.o. daily   Discontinue nifedipine 30 mg p.o. and monitor blood pressure at  home  If blood pressure uncontrolled, plan to start low-dose ARB   If no change in symptoms, consider repeat Holter monitor and antiarrhythmic therapy versus cardiac ablation as discussed    Orders:  -     verapamil SR (CALAN-SR) 180 MG CR tablet; Take 1 tablet by mouth Every Night.  Dispense: 90 tablet; Refill: 3         Return in about 3 months (around 11/16/2024) for Follow-up with Dr Grullon.    There are no Patient Instructions on file for this visit.    Osmar Grullon MD

## 2024-11-15 ENCOUNTER — OFFICE VISIT (OUTPATIENT)
Dept: CARDIOLOGY | Facility: CLINIC | Age: 49
End: 2024-11-15
Payer: COMMERCIAL

## 2024-11-15 VITALS
HEART RATE: 52 BPM | DIASTOLIC BLOOD PRESSURE: 74 MMHG | RESPIRATION RATE: 16 BRPM | HEIGHT: 60 IN | WEIGHT: 214 LBS | BODY MASS INDEX: 42.01 KG/M2 | OXYGEN SATURATION: 97 % | SYSTOLIC BLOOD PRESSURE: 118 MMHG

## 2024-11-15 DIAGNOSIS — I10 ESSENTIAL HYPERTENSION: ICD-10-CM

## 2024-11-15 DIAGNOSIS — R00.2 PALPITATIONS: Primary | ICD-10-CM

## 2024-11-15 DIAGNOSIS — E78.00 PRIMARY HYPERCHOLESTEROLEMIA: ICD-10-CM

## 2024-11-15 PROCEDURE — 99214 OFFICE O/P EST MOD 30 MIN: CPT | Performed by: INTERNAL MEDICINE

## 2024-11-15 NOTE — ASSESSMENT & PLAN NOTE
Lipid abnormalities are newly stable.     Plan:  Lipid lowering therapy not prescribed due to patient age, low ASCVD score and trial of TLC first.     Discussed medication dosage, use, side effects, and goals of treatment in detail.    Counseled patient on lifestyle modifications to help control hyperlipidemia.   Cholesterol lowering dietary information shared with patient.  Patient counseled in regards to heart healthy, low fat/ low cholesterol/low sat diet, daily exercise for 30 minutes, low to moderate intensity, and weight loss.               Lab Results   Component Value Date     CHLPL 182 01/11/2024     TRIG 63 01/11/2024     HDL 50 01/11/2024      (H) 01/11/2024      Goal of therapy: LDL  mg/dL     Follow up lipid profile prior to next visit, fasting as discussed with patient, at PCP office or walk-in at our cardiology office.

## 2024-11-15 NOTE — PROGRESS NOTES
"[facility]  [facility] CARDIOLOGY  [address] [place] [address]  Dept: [phone]  Dept Fax: [phone]    Date: 11/15/2024  Patient: Radha Grimm  YOB: 1975    Follow Up Office Visit Note    Interval Follow-up  Ms. Radha Grimm is a 49 y.o. female who is here for follow-up on essential hypertension.    Subjective   Patient doing well with no complaints.  Patient denies angina, orthopnea, PND, palpitations, lightheadedness, syncope or medications side-effects.      The following portions of the patient's history were reviewed and updated as appropriate: allergies, current medications, past family history, past medical history, past social history, past surgical history, and problem list.    Medications: No Known Allergies   Current Outpatient Medications   Medication Instructions    hydroCHLOROthiazide (MICROZIDE) 12.5 mg, Oral, Daily    verapamil SR (CALAN-SR) 180 mg, Oral, Nightly       Tobacco Use: Low Risk  (11/15/2024)    Patient History     Smoking Tobacco Use: Never     Smokeless Tobacco Use: Never     Passive Exposure: Not on file        Objective  Vitals:    11/15/24 1033   BP: 118/74   Pulse: 52   Resp: 16   SpO2: 97%   Weight: 97.1 kg (214 lb)   Height: 152.4 cm (60\")   PainSc: 0-No pain      Vitals:    11/15/24 1033   BP: 118/74   Pulse: 52   Resp: 16   SpO2: 97%   Weight: 97.1 kg (214 lb)   Height: 152.4 cm (60\")          Physical Exam  Constitutional:       Appearance: Not in distress.   Neck:      Vascular: JVD normal.   Pulmonary:      Breath sounds: Normal breath sounds.   Cardiovascular:      Normal rate. Regular rhythm.      Murmurs: There is no murmur.   Pulses:     Intact distal pulses.   Edema:     Peripheral edema absent.   Neurological:      Mental Status: Alert.              Diagnostic Data  Lab Results   Component Value Date     05/16/2024    K 3.8 05/16/2024    CL 99 05/16/2024    CO2 23 05/16/2024    BUN 15 05/16/2024    " "CREATININE 0.75 05/16/2024    CALCIUM 9.7 05/16/2024    BILITOT 0.4 01/11/2024    ALKPHOS 68 01/11/2024    ALT 12 01/11/2024    AST 18 01/11/2024    GLUCOSE 89 05/16/2024    ALBUMIN 4.4 01/11/2024     No results found for: \"WBC\", \"HGB\", \"HCT\", \"PLT\"  No results found for: \"APTT\", \"INR\", \"PTT\"  No results found for: \"CKTOTAL\", \"TROPONINI\", \"TROPONINT\", \"CKMBINDEX\", \"BNP\"  No results found for: \"BNP\", \"PROBNP\"    Lab Results   Component Value Date    CHLPL 182 01/11/2024    TRIG 63 01/11/2024    HDL 50 01/11/2024     (H) 01/11/2024     Lab Results   Component Value Date    TSH 1.890 01/11/2024       CV Diagnostics:  Procedures    CXR: No results found for this or any previous visit.     ECHO/MUGA: Results for orders placed in visit on 02/29/24    Adult Transthoracic Echo Complete W/ Cont if Necessary Per Protocol    Interpretation Summary    Normal LV size and vigorous function, ejection fraction estimated by 2D, 66 - 70%.    Poorly visualized aortic valve, appears to be trileaflet with mild sclerosis, no stenosis and trace regurgitation    Diastolic function normal for age     STRESS TESTS: No results found for this or any previous visit.     CARDIAC CATH: No results found for this or any previous visit.     DEVICES: No valid procedures specified.   HOLTER: Results for orders placed in visit on 02/20/24    Holter Monitor - 48 Hour    Interpretation Summary    An abnormal monitor study.    There was 1 event of atrial tachycardia 4 beats average rate 115 bpm fastest rate 140 bpm.    There was 1 event of nonsustained ventricular tachycardia 3 beats average rate 164 bpm fastest rate 190 bpm.    No events reported by patient/no diary provided.    There was no atrial fibrillation, cardiac pauses or heart blocks detected.     CT/MRI:  No results found for this or any previous visit.    VASCULAR: No valid procedures specified.     Assessment and Plan  Diagnoses and all orders for this visit:    1. Palpitations " (Primary)  Assessment & Plan:  Holter monitor showing SVT and nonsustained VT.  Workup negative with normal echocardiogram.  Blood pressure to target.  Electrolytes to goal.  Resolution on higher dose of verapamil.  BP well-controlled.  Plan:  Continue verapamil to 180 mg p.o. daily  Follow-up clinically      2. Essential hypertension  Assessment & Plan:  Hypertension is stable and controlled  Continue current treatment regimen.  Regular aerobic exercise.  Ambulatory blood pressure monitoring.  Blood pressure will be reassessed in 1 year.      3. Primary hypercholesterolemia  Assessment & Plan:  Lipid abnormalities are newly stable.     Plan:  Lipid lowering therapy not prescribed due to patient age, low ASCVD score and trial of TLC first.     Discussed medication dosage, use, side effects, and goals of treatment in detail.    Counseled patient on lifestyle modifications to help control hyperlipidemia.   Cholesterol lowering dietary information shared with patient.  Patient counseled in regards to heart healthy, low fat/ low cholesterol/low sat diet, daily exercise for 30 minutes, low to moderate intensity, and weight loss.               Lab Results   Component Value Date     CHLPL 182 01/11/2024     TRIG 63 01/11/2024     HDL 50 01/11/2024      (H) 01/11/2024      Goal of therapy: LDL  mg/dL     Follow up lipid profile prior to next visit, fasting as discussed with patient, at PCP office or walk-in at our cardiology office.           Return in about 1 year (around 11/15/2025) for Follow-up with Dr Grullon.    There are no Patient Instructions on file for this visit.    Osmar Grullon MD

## 2024-11-15 NOTE — ASSESSMENT & PLAN NOTE
Holter monitor showing SVT and nonsustained VT.  Workup negative with normal echocardiogram.  Blood pressure to target.  Electrolytes to goal.  Resolution on higher dose of verapamil.  BP well-controlled.  Plan:  Continue verapamil to 180 mg p.o. daily  Follow-up clinically

## 2024-11-15 NOTE — ASSESSMENT & PLAN NOTE
Hypertension is stable and controlled  Continue current treatment regimen.  Regular aerobic exercise.  Ambulatory blood pressure monitoring.  Blood pressure will be reassessed in 1 year.

## 2024-12-23 ENCOUNTER — OFFICE VISIT (OUTPATIENT)
Dept: FAMILY MEDICINE CLINIC | Facility: CLINIC | Age: 49
End: 2024-12-23
Payer: COMMERCIAL

## 2024-12-23 VITALS
HEIGHT: 60 IN | RESPIRATION RATE: 16 BRPM | SYSTOLIC BLOOD PRESSURE: 118 MMHG | DIASTOLIC BLOOD PRESSURE: 74 MMHG | HEART RATE: 60 BPM | BODY MASS INDEX: 42.6 KG/M2 | OXYGEN SATURATION: 96 % | WEIGHT: 217 LBS

## 2024-12-23 DIAGNOSIS — Z00.00 ROUTINE PHYSICAL EXAMINATION: Primary | ICD-10-CM

## 2024-12-23 DIAGNOSIS — I10 HYPERTENSION, ESSENTIAL: ICD-10-CM

## 2024-12-23 DIAGNOSIS — Z13.1 DIABETES MELLITUS SCREENING: ICD-10-CM

## 2024-12-23 DIAGNOSIS — Z13.220 LIPID SCREENING: ICD-10-CM

## 2024-12-23 DIAGNOSIS — Z12.31 ENCOUNTER FOR SCREENING MAMMOGRAM FOR MALIGNANT NEOPLASM OF BREAST: ICD-10-CM

## 2024-12-23 DIAGNOSIS — Z12.11 COLON CANCER SCREENING: ICD-10-CM

## 2024-12-23 PROCEDURE — 99396 PREV VISIT EST AGE 40-64: CPT | Performed by: PHYSICIAN ASSISTANT

## 2024-12-23 NOTE — PROGRESS NOTES
"Chief Complaint  Annual Exam    Subjective          Radha Grimm presents to [facility]  History of Present Illness  Patient in today for routine physical exam and fasting labs.  She states she has been feeling well.  Denies any chest pain or shortness of breath.  She is interested in getting a referral to weight management to help with weight loss.  She states she has been trying to work on healthier diet and exercise.  Her blood pressure has been doing well.  She follows with cardiology annually.  She states she is due for mammogram and would like to get this scheduled.  She is also due for colon cancer screening.  She declines a colonoscopy, but would like to do the Cologuard.  Denies any family history of colon cancer or blood in the stool.  She states she is up-to-date on her eye and dental exams.      Objective   Vital Signs:   /74 (BP Location: Left arm, Patient Position: Sitting, Cuff Size: Adult)   Pulse 60   Resp 16   Ht 152.4 cm (60\")   Wt 98.4 kg (217 lb)   SpO2 96%   BMI 42.38 kg/m²     Body mass index is 42.38 kg/m².    Review of Systems   Constitutional:  Negative for fatigue and fever.   HENT:  Negative for congestion and sore throat.    Respiratory:  Negative for cough and shortness of breath.    Cardiovascular:  Negative for chest pain.   Gastrointestinal:  Negative for abdominal pain, diarrhea, nausea and vomiting.   Genitourinary:  Negative for dysuria and frequency.   Neurological:  Negative for dizziness and headache.   Psychiatric/Behavioral:  Negative for depressed mood. The patient is not nervous/anxious.        Past History:  Medical History: has a past medical history of Hypertension (12/23/2023) and Urinary tract infection.   Surgical History: has no past surgical history on file.   Family History: family history includes Cancer in her mother; Hyperlipidemia in her father; Hypertension in her father.   Social History: reports that she has never " smoked. She has never used smokeless tobacco. She reports current alcohol use of about 1.0 standard drink of alcohol per week. She reports that she does not use drugs.      Current Outpatient Medications:     hydroCHLOROthiazide (MICROZIDE) 12.5 MG capsule, Take 1 capsule by mouth Daily., Disp: 90 capsule, Rfl: 3    verapamil SR (CALAN-SR) 180 MG CR tablet, Take 1 tablet by mouth Every Night., Disp: 90 tablet, Rfl: 3  Allergies: Patient has no known allergies.    Physical Exam  Constitutional:       Appearance: Normal appearance.   HENT:      Right Ear: Tympanic membrane normal.      Left Ear: Tympanic membrane normal.      Mouth/Throat:      Pharynx: Oropharynx is clear.   Eyes:      Conjunctiva/sclera: Conjunctivae normal.      Pupils: Pupils are equal, round, and reactive to light.   Cardiovascular:      Rate and Rhythm: Normal rate and regular rhythm.      Heart sounds: Normal heart sounds.   Pulmonary:      Effort: Pulmonary effort is normal.      Breath sounds: Normal breath sounds.   Abdominal:      Palpations: Abdomen is soft.      Tenderness: There is no abdominal tenderness.   Neurological:      Mental Status: She is oriented to person, place, and time.   Psychiatric:         Mood and Affect: Mood normal.         Behavior: Behavior normal.             Assessment and Plan   Diagnoses and all orders for this visit:    1. Routine physical examination (Primary)  Encouraged healthy diet and exercise. Fasting labs today.   2. Hypertension, essential  -     CBC & Differential  -     Comprehensive Metabolic Panel  -     TSH  Continue current medication and f/up with cardiology as directed.   3. Encounter for screening mammogram for malignant neoplasm of breast  -     Mammo Screening Digital Tomosynthesis Bilateral With CAD; Future  Will set up for mammogram.   4. Lipid screening  -     Lipid Panel  Fasting lipid panel with labs.   5. Colon cancer screening  -     Cologuard - Stool, Per Rectum; Future  Will order  anupam.   6. Diabetes mellitus screening  -     Hemoglobin A1c  Will check hga1c with labs.   7. BMI 40.0-44.9, adult  -     Ambulatory Referral to Weight Management Program  Will  put in referral for weight  management program; encourage healthy diet and exercise           Follow Up   No follow-ups on file.  Patient was given instructions and counseling regarding her condition or for health maintenance advice. Please see specific information pulled into the AVS if appropriate.     COLT AnthonyC

## 2024-12-24 LAB
ALBUMIN SERPL-MCNC: 4.4 G/DL (ref 3.9–4.9)
ALP SERPL-CCNC: 81 IU/L (ref 44–121)
ALT SERPL-CCNC: 14 IU/L (ref 0–32)
AST SERPL-CCNC: 18 IU/L (ref 0–40)
BASOPHILS # BLD AUTO: 0 X10E3/UL (ref 0–0.2)
BASOPHILS NFR BLD AUTO: 0 %
BILIRUB SERPL-MCNC: 0.5 MG/DL (ref 0–1.2)
BUN SERPL-MCNC: 18 MG/DL (ref 6–24)
BUN/CREAT SERPL: 23 (ref 9–23)
CALCIUM SERPL-MCNC: 9.3 MG/DL (ref 8.7–10.2)
CHLORIDE SERPL-SCNC: 102 MMOL/L (ref 96–106)
CHOLEST SERPL-MCNC: 207 MG/DL (ref 100–199)
CO2 SERPL-SCNC: 23 MMOL/L (ref 20–29)
CREAT SERPL-MCNC: 0.79 MG/DL (ref 0.57–1)
EGFRCR SERPLBLD CKD-EPI 2021: 92 ML/MIN/1.73
EOSINOPHIL # BLD AUTO: 0.3 X10E3/UL (ref 0–0.4)
EOSINOPHIL NFR BLD AUTO: 4 %
ERYTHROCYTE [DISTWIDTH] IN BLOOD BY AUTOMATED COUNT: 12.5 % (ref 11.7–15.4)
GLOBULIN SER CALC-MCNC: 2.6 G/DL (ref 1.5–4.5)
GLUCOSE SERPL-MCNC: 91 MG/DL (ref 70–99)
HBA1C MFR BLD: 5.8 % (ref 4.8–5.6)
HCT VFR BLD AUTO: 42.4 % (ref 34–46.6)
HDLC SERPL-MCNC: 64 MG/DL
HGB BLD-MCNC: 14.3 G/DL (ref 11.1–15.9)
IMM GRANULOCYTES # BLD AUTO: 0 X10E3/UL (ref 0–0.1)
IMM GRANULOCYTES NFR BLD AUTO: 0 %
LDLC SERPL CALC-MCNC: 128 MG/DL (ref 0–99)
LYMPHOCYTES # BLD AUTO: 2.4 X10E3/UL (ref 0.7–3.1)
LYMPHOCYTES NFR BLD AUTO: 32 %
MCH RBC QN AUTO: 32.1 PG (ref 26.6–33)
MCHC RBC AUTO-ENTMCNC: 33.7 G/DL (ref 31.5–35.7)
MCV RBC AUTO: 95 FL (ref 79–97)
MONOCYTES # BLD AUTO: 0.6 X10E3/UL (ref 0.1–0.9)
MONOCYTES NFR BLD AUTO: 8 %
NEUTROPHILS # BLD AUTO: 4.1 X10E3/UL (ref 1.4–7)
NEUTROPHILS NFR BLD AUTO: 56 %
PLATELET # BLD AUTO: 267 X10E3/UL (ref 150–450)
POTASSIUM SERPL-SCNC: 3.9 MMOL/L (ref 3.5–5.2)
PROT SERPL-MCNC: 7 G/DL (ref 6–8.5)
RBC # BLD AUTO: 4.45 X10E6/UL (ref 3.77–5.28)
SODIUM SERPL-SCNC: 141 MMOL/L (ref 134–144)
TRIGL SERPL-MCNC: 86 MG/DL (ref 0–149)
TSH SERPL DL<=0.005 MIU/L-ACNC: 1.8 UIU/ML (ref 0.45–4.5)
VLDLC SERPL CALC-MCNC: 15 MG/DL (ref 5–40)
WBC # BLD AUTO: 7.4 X10E3/UL (ref 3.4–10.8)

## 2024-12-26 ENCOUNTER — TELEPHONE (OUTPATIENT)
Dept: FAMILY MEDICINE CLINIC | Facility: CLINIC | Age: 49
End: 2024-12-26
Payer: COMMERCIAL

## 2024-12-26 NOTE — TELEPHONE ENCOUNTER
LVM for pt to call us back for results    Hub to relay:  Please let her know her labs showed her LDL cholesterol at 128, nml < 100  and 3 month sugar avg in prediabetic range at 5.8--- please work on healthy diet and exercise to help keep these low ; blood count and thyroid test were normal; thanks

## 2024-12-27 NOTE — TELEPHONE ENCOUNTER
Name: Radha Grimm      Relationship: Self      Best Callback Number: [phone]       HUB PROVIDED THE RELAY MESSAGE FROM THE OFFICE      PATIENT: VOICED UNDERSTANDING AND HAS NO FURTHER QUESTIONS AT THIS TIME    ADDITIONAL INFORMATION:

## 2025-02-04 ENCOUNTER — TELEPHONE (OUTPATIENT)
Dept: FAMILY MEDICINE CLINIC | Facility: CLINIC | Age: 50
End: 2025-02-04
Payer: COMMERCIAL

## 2025-02-04 DIAGNOSIS — N63.10 MASS OF RIGHT BREAST, UNSPECIFIED QUADRANT: ICD-10-CM

## 2025-02-04 DIAGNOSIS — N64.89 BREAST ASYMMETRY: Primary | ICD-10-CM

## 2025-02-04 NOTE — TELEPHONE ENCOUNTER
Called and spoke with patient and discussed mammogram findings- recommendation for diagnostic mammogram and ultrasound- have put in order for patient to be scheduled

## 2025-02-11 DIAGNOSIS — N63.10 MASS OF RIGHT BREAST, UNSPECIFIED QUADRANT: Primary | ICD-10-CM

## 2025-02-18 ENCOUNTER — OFFICE VISIT (OUTPATIENT)
Age: 50
End: 2025-02-18
Payer: COMMERCIAL

## 2025-02-18 VITALS
HEIGHT: 61 IN | HEART RATE: 77 BPM | BODY MASS INDEX: 40.55 KG/M2 | SYSTOLIC BLOOD PRESSURE: 126 MMHG | DIASTOLIC BLOOD PRESSURE: 86 MMHG | WEIGHT: 214.8 LBS

## 2025-02-18 DIAGNOSIS — E66.01 OBESITY, CLASS III, BMI 40-49.9 (MORBID OBESITY): Primary | ICD-10-CM

## 2025-02-18 DIAGNOSIS — Z79.899 MEDICATION MANAGEMENT: ICD-10-CM

## 2025-02-18 PROBLEM — E66.813 OBESITY, CLASS III, BMI 40-49.9 (MORBID OBESITY): Status: ACTIVE | Noted: 2025-02-18

## 2025-02-18 LAB
BUPRENORPHINE SERPL-MCNC: NEGATIVE NG/ML
MDMA UR QL SCN: NEGATIVE
POC AMPHETAMINES: NEGATIVE
POC BARBITURATES: NEGATIVE
POC BENZODIAZEPHINES: NEGATIVE
POC COCAINE: NEGATIVE
POC METHADONE: NEGATIVE
POC METHAMPHETAMINE SCREEN URINE: NEGATIVE
POC OPIATES: NEGATIVE
POC OXYCODONE: NEGATIVE
POC PHENCYCLIDINE: NEGATIVE
POC THC: NEGATIVE

## 2025-02-18 RX ORDER — PHENTERMINE HYDROCHLORIDE 37.5 MG/1
37.5 TABLET ORAL
Qty: 30 TABLET | Refills: 0 | Status: SHIPPED | OUTPATIENT
Start: 2025-02-18

## 2025-02-18 RX ORDER — LANOLIN ALCOHOL/MO/W.PET/CERES
1000 CREAM (GRAM) TOPICAL DAILY
COMMUNITY

## 2025-02-18 NOTE — ASSESSMENT & PLAN NOTE
Patient's (Body mass index is 41.26 kg/m².) indicates that they are morbidly/severely obese (BMI > 40 or > 35 with obesity - related health condition) with health conditions that include hypertension and dyslipidemias . Weight is newly identified. BMI  is above average; BMI management plan is completed. We discussed low calorie, low carb based diet program, portion control, increasing exercise, and an an-based approach such as [employer] Pal or Lose It.       -- This is an initial visit. Topics of discussion included obesity as a disease, nutritional education on food groups, exercise, and medications.Patient's past medical history was reviewed in detail and barriers to weight loss were identified and discussed. Past efforts at weight reduction on their own as well as under physician supervision were documented and discussed.  I advised patient to continue routine care with their Primary Care Provider.  --Body composition analysis was reviewed. Short and long-term weight loss goals set up based on this information.  --Patient was instructed on adequate protein, controlled carb and controlled fat intake. Baritastic food journal set up with calorie and macro goals set.  Patient encouraged to start journaling daily.  Encouraged that we are not necessarily looking for perfection but starting to learn where calories and macros are staying.  Patient advised that were looking to stay at or below calorie and carbohydrate levels and at or above protein and fiber levels. Asked patient to bring in food journal to next office visit for brief review  --Patient is to try nutritonal/behavioral changes only first.  --Fasting labs reviewed from December  --Discussed medication management options for weight loss including stimulants (phentermine or diethylpropion), Contrave, and GLP-1. Mechanism of action, adverse and side effects, and benefits of each class reviewed.   --Phentermine 37.5 mg daily. Dosing instructions, adverse  effects and side effects discussed with patient (GLORIA and KRISS reviewed)

## 2025-02-18 NOTE — PROGRESS NOTES
[facility]  [facility]  [address] [place], [place] [address] [place] [address]      Date: 2025  Patient Name: Radha Grimm  MRN: [id number]  : 1975    Subjective     Chief Complaint  Obesity Management consult, nutrition counseling       History of Present Illness:  Radha Grimm presents to [facility] for obesity management. Personal goal is to lose 70 pounds to look and feel healthy. Her weight has been up and down throughout her lifetime. About two years ago she was at her healthiest which she accomplished with diet and strength training.     Weight history:  Highest lifetime weight: 233 pounds. Today's weight is 97.4 kg (214 lb 12.8 oz) pounds.   Weight 5 years ago: 200+    Current lifestyle:   The following seem to sabotage weight loss efforts:comfort/stress eating, enjoyment of food, liquid calories such as alcohol, mindless eating (snacking while working or watching TV), and boredom eating    Past diets: weight watchers  Past weight loss medications: none    Typical Diet:  Breakfast: eggs, hashbrowns or breakfast burrito  Lunch: salad on work days  Dinner: [employer]als or Mexican or other restaurant   Snacks: Vegetables, crackers or chips  Beverages: water (24 oz/day), unsweet tea (32 oz/day), sports drinks (16 oz/day)    Pertinent medical history:  Pancreatitis: no  Glaucoma: no  Headaches: no  HTN: yes, controlled on meds  Heart palpitations: yes, induced by medication. Resolved since stopping med.  Thyroid C cell cancer personal or family hx: no  MEN syndrome personal or family hx: no  Nephrolithiasis: no    PHQ-9 Total Score:   Little interest or pleasure in doing things? Not at all   Feeling down, depressed, or hopeless? Not at all   PHQ-2 Total Score 0              Review of Systems   Constitutional:  Positive for fatigue and unexpected weight gain.        Positive for weight gain   HENT:  Negative for trouble swallowing.          "Negative for throat swelling   Respiratory:  Negative for shortness of breath and wheezing.         Negative for snoring   Cardiovascular:  Negative for chest pain, palpitations and leg swelling.   Gastrointestinal:  Negative for abdominal pain, constipation, diarrhea, GERD and indigestion.   Endocrine: Negative for cold intolerance, heat intolerance, polydipsia, polyphagia and polyuria.        Negative for loss of hair  Negative for hirsutism     Genitourinary:         Denies menstrual irregularities   Musculoskeletal:  Negative for arthralgias.        Denies exercise limitations  Denies chronic pain   Skin:  Positive for dry skin.        Negative for acne   Neurological:  Negative for headache and memory problem.        Negative for paresthesias   Psychiatric/Behavioral:  Negative for self-injury, sleep disturbance, suicidal ideas and depressed mood. The patient is not nervous/anxious.    All other systems reviewed and are negative.        Objective     Body mass index is 41.26 kg/m².   Body composition analysis completed and showed:   Body Fat %: 46.2     Measurements (in inches)  Measurements (in inches) Waist Circumference: 40   Neck: 14   Chest: 42.5  Hips: 47.5  Thighs: 45    Vital Signs:   /86 (BP Location: Left arm, Patient Position: Sitting)   Pulse 77   Ht 153.7 cm (60.5\")   Wt 97.4 kg (214 lb 12.8 oz)   BMI 41.26 kg/m²     Physical Exam  Constitutional:       Appearance: Normal appearance.   HENT:      Head: Normocephalic and atraumatic.   Pulmonary:      Effort: Pulmonary effort is normal.   Neurological:      Mental Status: She is alert and oriented to person, place, and time.   Psychiatric:         Mood and Affect: Mood normal.         Thought Content: Thought content normal.          Result Review :     Common labs          5/16/2024    10:51 12/23/2024    10:35   Common Labs   Glucose 89  91    BUN 15  18    Creatinine 0.75  0.79    Sodium 138  141    Potassium 3.8  3.9    Chloride 99  102 "    Calcium 9.7  9.3    Albumin  4.4    Total Bilirubin  0.5    Alkaline Phosphatase  81    AST (SGOT)  18    ALT (SGPT)  14    WBC  7.4    Hemoglobin  14.3    Hematocrit  42.4    Platelets  267    Total Cholesterol  207    Triglycerides  86    HDL Cholesterol  64    LDL Cholesterol   128    Hemoglobin A1C  5.8             Component  Ref Range & Units 15:06   Methamphetaine Screen, Urine  Negative Negative   POC Amphetamines  Negative Negative   Barbiturates Screen  Negative Negative   Benzodiazepine Screen  Negative Negative   Cocaine Screen  Negative Negative   Methadone Screen  Negative Negative   Opiate Screen  Negative Negative   Oxycodone, Screen  Negative Negative   Phencyclidine (PCP) Screen  Negative Negative   Buprenorphine, Screen, Urine  Negative Negative   THC, Screen  Negative Negative   MDMA (ECSTASY)  Negative Negative   Resulting Agency [facility]          Assessment / Plan       Diagnoses and all orders for this visit:    1. Obesity, Class III, BMI 40-49.9 (morbid obesity) (Primary)  Assessment & Plan:  Patient's (Body mass index is 41.26 kg/m².) indicates that they are morbidly/severely obese (BMI > 40 or > 35 with obesity - related health condition) with health conditions that include hypertension and dyslipidemias . Weight is newly identified. BMI  is above average; BMI management plan is completed. We discussed low calorie, low carb based diet program, portion control, increasing exercise, and an an-based approach such as [employer] Pal or Lose It.       -- This is an initial visit. Topics of discussion included obesity as a disease, nutritional education on food groups, exercise, and medications.Patient's past medical history was reviewed in detail and barriers to weight loss were identified and discussed. Past efforts at weight reduction on their own as well as under physician supervision were documented and discussed.  I advised patient to continue routine care with  their Primary Care Provider.  --Body composition analysis was reviewed. Short and long-term weight loss goals set up based on this information.  --Patient was instructed on adequate protein, controlled carb and controlled fat intake. Baritastic food journal set up with calorie and macro goals set.  Patient encouraged to start journaling daily.  Encouraged that we are not necessarily looking for perfection but starting to learn where calories and macros are staying.  Patient advised that were looking to stay at or below calorie and carbohydrate levels and at or above protein and fiber levels. Asked patient to bring in food journal to next office visit for brief review  --Patient is to try nutritonal/behavioral changes only first.  --Fasting labs reviewed from December  --Discussed medication management options for weight loss including stimulants (phentermine or diethylpropion), Contrave, and GLP-1. Mechanism of action, adverse and side effects, and benefits of each class reviewed.   --Phentermine 37.5 mg daily. Dosing instructions, adverse effects and side effects discussed with patient (GLORIA and KRISS reviewed)              2. Medication management  -     POC Urine Drug Screen, Triage        I spent 60 minutes caring for Radha on this date of service. This time includes time spent by me in the following activities:preparing for the visit, reviewing tests, counseling and educating the patient/family/caregiver, ordering medications, tests, or procedures, and documenting information in the medical record  Follow Up   Return in about 4 weeks (around 3/18/2025).  Patient was given instructions and counseling regarding her condition or for health maintenance advice. Please see specific information pulled into the AVS if appropriate.     Rey Mendoza, APRN  02/18/2025

## 2025-03-06 RX ORDER — HYDROCHLOROTHIAZIDE 12.5 MG/1
12.5 CAPSULE ORAL DAILY
Qty: 90 CAPSULE | Refills: 3 | Status: SHIPPED | OUTPATIENT
Start: 2025-03-06

## 2025-03-12 LAB
NCCN CRITERIA FLAG: NORMAL
TYRER CUZICK SCORE: 9.3

## 2025-03-13 ENCOUNTER — HOSPITAL ENCOUNTER (OUTPATIENT)
Facility: HOSPITAL | Age: 50
Discharge: HOME | End: 2025-03-13
Payer: COMMERCIAL

## 2025-03-13 DIAGNOSIS — N63.10 MASS OF RIGHT BREAST, UNSPECIFIED QUADRANT: ICD-10-CM

## 2025-03-13 DIAGNOSIS — N64.89 BREAST ASYMMETRY: ICD-10-CM

## 2025-03-13 PROCEDURE — 77065 DX MAMMO INCL CAD UNI: CPT

## 2025-03-13 PROCEDURE — 76642 ULTRASOUND BREAST LIMITED: CPT

## 2025-03-13 PROCEDURE — G0279 TOMOSYNTHESIS, MAMMO: HCPCS

## 2025-03-18 ENCOUNTER — OFFICE VISIT (OUTPATIENT)
Age: 50
End: 2025-03-18
Payer: COMMERCIAL

## 2025-03-18 VITALS
SYSTOLIC BLOOD PRESSURE: 118 MMHG | BODY MASS INDEX: 39.16 KG/M2 | WEIGHT: 207.4 LBS | HEIGHT: 61 IN | DIASTOLIC BLOOD PRESSURE: 72 MMHG | HEART RATE: 83 BPM

## 2025-03-18 DIAGNOSIS — E66.812 OBESITY, CLASS II, BMI 35-39.9: Primary | ICD-10-CM

## 2025-03-18 PROCEDURE — 99214 OFFICE O/P EST MOD 30 MIN: CPT | Performed by: NURSE PRACTITIONER

## 2025-03-18 RX ORDER — PHENTERMINE HYDROCHLORIDE 37.5 MG/1
37.5 TABLET ORAL
Qty: 30 TABLET | Refills: 0 | Status: SHIPPED | OUTPATIENT
Start: 2025-03-18

## 2025-03-18 NOTE — PROGRESS NOTES
[facility]  [address] [place] [id number]    Office Note Follow Up      Date: 2025  Patient Name: Radha Grimm  MRN: [id number]  : 1975    Subjective     Chief Complaint  Obesity Management follow-up    Radha Grimm presents to [facility] for obesity management.     Patient is satisfied with weight loss progress. Appetite is well controlled. Reports no side effects of prescribed medications, phentermine. The patient is taking multivitamin and is taking fish oil.  The patient is using a food journal.    Average daily calories: 1109  Average daily protein: 86  Average daily net carbs: 62    The patient is exercising with a FITT score of:    Frequency Intensity Time Strength Training   []   0, none []   0 []   0 []   0   []   1 (1-2x/week) [x]   1 (light) []   1 (<10 min) [x]   1 (1x/week)   [x]   2 (3-5x/week) []   2 (moderate) []   2 (10-20 min) []   2 (2x/week)   []   3 (daily) []   3 (moderately hard)  []   4 (very hard) [x]   3 (20-30 min)  []   4 (>30 min) []   3 (3-4x/week)     Review of Systems   Constitutional:  Negative for appetite change and fatigue.   Eyes:  Negative for visual disturbance.   Cardiovascular:  Negative for chest pain and palpitations.   Gastrointestinal:  Negative for constipation and indigestion.   Neurological:  Negative for light-headedness.   All other systems reviewed and are negative.      Objective   Start weight: 214.8 pounds.    Total Loss lb/%Loss of beginning body weight (BBW): -7.4 lb/-3.45 %  Change in weight since last visit: -7.4     Recent Weight History:   Wt Readings from Last 6 Encounters:   25 94.1 kg (207 lb 6.4 oz)   25 97.4 kg (214 lb 12.8 oz)   24 98.4 kg (217 lb)   11/15/24 97.1 kg (214 lb)   24 94.5 kg (208 lb 6.4 oz)   24 92.1 kg (203 lb)         Body mass index is 39.84 kg/m².   Body composition analysis completed and showed:   Body Fat %:  "41.5    Measurements (in inches)  Waist Circumference: 40    Vital Signs:   /72 (BP Location: Left arm, Patient Position: Sitting)   Pulse 83   Ht 153.7 cm (60.5\")   Wt 94.1 kg (207 lb 6.4 oz)   BMI 39.84 kg/m²       Physical Exam  Constitutional:       Appearance: Normal appearance.   HENT:      Head: Normocephalic and atraumatic.   Pulmonary:      Effort: Pulmonary effort is normal.   Neurological:      Mental Status: She is alert and oriented to person, place, and time.   Psychiatric:         Mood and Affect: Mood normal.         Thought Content: Thought content normal.        Result Review :     Common labs          5/16/2024    10:51 12/23/2024    10:35   Common Labs   Glucose 89  91    BUN 15  18    Creatinine 0.75  0.79    Sodium 138  141    Potassium 3.8  3.9    Chloride 99  102    Calcium 9.7  9.3    Albumin  4.4    Total Bilirubin  0.5    Alkaline Phosphatase  81    AST (SGOT)  18    ALT (SGPT)  14    WBC  7.4    Hemoglobin  14.3    Hematocrit  42.4    Platelets  267    Total Cholesterol  207    Triglycerides  86    HDL Cholesterol  64    LDL Cholesterol   128    Hemoglobin A1C  5.8               Assessment / Plan        Diagnoses and all orders for this visit:    1. Obesity, Class II, BMI 35-39.9 (Primary)  Assessment & Plan:  Patient's (Body mass index is 39.84 kg/m².) indicates that they are obese (BMI >30) with health conditions that include hypertension, dyslipidemias, and osteoarthritis . Weight is improving with treatment. BMI  is above average; BMI management plan is completed. We discussed low calorie, low carb based diet program, portion control, increasing exercise, and pharmacologic options including phentermine .    I have instructed the patient to continue with pursuit of medical weight loss as a part of this program. Patient does meet criteria for use of anorectics at this time as BMI >30  and body fat percentage >30%.     The current plan for this month includes:   - Continue " current exercise efforts  - It is appropriate to continue anorectic medications as prescribed at this time  - Weight loss goal 4-6lbs this month  - Adjust macronutrients as discussed. Bring food journal to next visit for review  >>Increase protein intake       Orders:  -     phentermine (ADIPEX-P) 37.5 MG tablet; Take 1 tablet by mouth Daily Before Lunch. Decongestants should be avoided while taking this medication.  Dispense: 30 tablet; Refill: 0        We discussed the risks, benefits, and limitations of treatments. Continue medications and OTC supplements as discussed. Patient verbalizes understanding of and agreement with management plan.     Follow Up   Return in about 4 weeks (around 4/15/2025).  Patient was given instructions and counseling regarding her condition or for health maintenance advice. Please see specific information pulled into the AVS if appropriate.         Rey Mendoza, APRN  03/18/2025

## 2025-03-19 PROBLEM — E66.812 OBESITY, CLASS II, BMI 35-39.9: Status: ACTIVE | Noted: 2025-02-18

## 2025-03-19 NOTE — ASSESSMENT & PLAN NOTE
Patient's (Body mass index is 39.84 kg/m².) indicates that they are obese (BMI >30) with health conditions that include hypertension, dyslipidemias, and osteoarthritis . Weight is improving with treatment. BMI  is above average; BMI management plan is completed. We discussed low calorie, low carb based diet program, portion control, increasing exercise, and pharmacologic options including phentermine .    I have instructed the patient to continue with pursuit of medical weight loss as a part of this program. Patient does meet criteria for use of anorectics at this time as BMI >30  and body fat percentage >30%.     The current plan for this month includes:   - Continue current exercise efforts  - It is appropriate to continue anorectic medications as prescribed at this time  - Weight loss goal 4-6lbs this month  - Adjust macronutrients as discussed. Bring food journal to next visit for review  >>Increase protein intake

## 2025-04-21 ENCOUNTER — OFFICE VISIT (OUTPATIENT)
Age: 50
End: 2025-04-21
Payer: COMMERCIAL

## 2025-04-21 VITALS
BODY MASS INDEX: 37.57 KG/M2 | SYSTOLIC BLOOD PRESSURE: 122 MMHG | HEIGHT: 61 IN | HEART RATE: 63 BPM | DIASTOLIC BLOOD PRESSURE: 80 MMHG | WEIGHT: 199 LBS

## 2025-04-21 DIAGNOSIS — E66.812 OBESITY, CLASS II, BMI 35-39.9: Primary | ICD-10-CM

## 2025-04-21 PROCEDURE — 99213 OFFICE O/P EST LOW 20 MIN: CPT | Performed by: NURSE PRACTITIONER

## 2025-04-21 RX ORDER — PHENTERMINE HYDROCHLORIDE 37.5 MG/1
37.5 TABLET ORAL
Qty: 30 TABLET | Refills: 0 | Status: SHIPPED | OUTPATIENT
Start: 2025-04-21

## 2025-04-21 NOTE — PROGRESS NOTES
[facility]  [address] [place] [id number]    Office Note Follow Up      Date: 2025  Patient Name: Radha Grimm  MRN: [id number]  : 1975    Subjective     Chief Complaint  Obesity Management follow-up    Radha Grimm presents to [facility] for obesity management.     Patient is satisfied with weight loss progress. Appetite is moderately controlled. Reports no side effects of prescribed medications, phentermine. She had a respiratory virus about two weeks ago and she stopped phentermine during this time as it was making her heart race while she was sick but has since resumed with no problems. The patient is taking multivitamin and is taking fish oil.  The patient is not using a food journal.  The patient is exercising with a FITT score of:    Frequency Intensity Time Strength Training   []   0, none []   0 []   0 [x]   0   []   1 (1-2x/week) [x]   1 (light) []   1 (<10 min) []   1 (1x/week)   []   2 (3-5x/week) []   2 (moderate) []   2 (10-20 min) []   2 (2x/week)   [x]   3 (daily) []   3 (moderately hard)  []   4 (very hard) []   3 (20-30 min)  [x]   4 (>30 min) []   3 (3-4x/week)     Review of Systems   Constitutional:  Negative for appetite change and fatigue.   Eyes:  Negative for visual disturbance.   Cardiovascular:  Negative for chest pain and palpitations.   Gastrointestinal:  Negative for constipation and indigestion.   Neurological:  Negative for light-headedness.   All other systems reviewed and are negative.      Objective   Start weight: 214.8 pounds.    Total Loss lb/%Loss of beginning body weight (BBW): -15.8 lb/-7.36 %  Change in weight since last visit: -8.4     Recent Weight History:   Wt Readings from Last 6 Encounters:   25 90.3 kg (199 lb)   25 94.1 kg (207 lb 6.4 oz)   25 97.4 kg (214 lb 12.8 oz)   24 98.4 kg (217 lb)   11/15/24 97.1 kg (214 lb)   24 94.5 kg (208 lb 6.4 oz)  "        Body mass index is 38.22 kg/m².   Body composition analysis completed and showed:   Body Fat %: 43.3    Measurements (in inches)  Waist Circumference: 38.5    Vital Signs:   /80 (BP Location: Left arm, Patient Position: Sitting)   Pulse 63   Ht 153.7 cm (60.5\")   Wt 90.3 kg (199 lb)   BMI 38.22 kg/m²       Physical Exam  Constitutional:       Appearance: Normal appearance.   HENT:      Head: Normocephalic and atraumatic.   Pulmonary:      Effort: Pulmonary effort is normal.   Neurological:      Mental Status: She is alert and oriented to person, place, and time.   Psychiatric:         Mood and Affect: Mood normal.         Thought Content: Thought content normal.        Result Review :                Assessment / Plan        Diagnoses and all orders for this visit:    1. Obesity, Class II, BMI 35-39.9 (Primary)  Assessment & Plan:  Patient's (Body mass index is 38.22 kg/m².) indicates that they are obese (BMI >30) with health conditions that include hypertension, dyslipidemias, and osteoarthritis . Weight is improving with treatment. BMI  is above average; BMI management plan is completed. We discussed low calorie, low carb based diet program, portion control, increasing exercise, and pharmacologic options including phentermine .    I have instructed the patient to continue with pursuit of medical weight loss as a part of this program. Patient does meet criteria for use of anorectics at this time as BMI >30 .     The current plan for this month includes:   - Continue current exercise efforts  - It is appropriate to continue anorectic medications as prescribed at this time  - Increase water to recommended daily amount  - Weight loss goal 4-6lbs this month  - Adjust macronutrients as discussed. Bring food journal to next visit for review  >>Increase protein intake, decrease net carb. Encouraged to resume journaling.       Orders:  -     phentermine (ADIPEX-P) 37.5 MG tablet; Take 1 tablet by mouth " Daily Before Lunch. Decongestants should be avoided while taking this medication.  Dispense: 30 tablet; Refill: 0        We discussed the risks, benefits, and limitations of treatments. Continue medications and OTC supplements as discussed. Patient verbalizes understanding of and agreement with management plan.     Follow Up   Return in about 4 weeks (around 5/19/2025).  Patient was given instructions and counseling regarding her condition or for health maintenance advice. Please see specific information pulled into the AVS if appropriate.     I spent 20 minutes on this date of service. This time includes time spent by me in the following activities:preparing for the visit, counseling and educating the patient/family/caregiver, ordering medications, tests, or procedures and documenting information in the medical record.    Rey Mendoza, APRN  04/21/2025

## 2025-04-21 NOTE — ASSESSMENT & PLAN NOTE
Patient's (Body mass index is 38.22 kg/m².) indicates that they are obese (BMI >30) with health conditions that include hypertension, dyslipidemias, and osteoarthritis . Weight is improving with treatment. BMI  is above average; BMI management plan is completed. We discussed low calorie, low carb based diet program, portion control, increasing exercise, and pharmacologic options including phentermine .    I have instructed the patient to continue with pursuit of medical weight loss as a part of this program. Patient does meet criteria for use of anorectics at this time as BMI >30 .     The current plan for this month includes:   - Continue current exercise efforts  - It is appropriate to continue anorectic medications as prescribed at this time  - Increase water to recommended daily amount  - Weight loss goal 4-6lbs this month  - Adjust macronutrients as discussed. Bring food journal to next visit for review  >>Increase protein intake, decrease net carb. Encouraged to resume journaling.

## 2025-05-28 ENCOUNTER — OFFICE VISIT (OUTPATIENT)
Age: 50
End: 2025-05-28
Payer: COMMERCIAL

## 2025-05-28 VITALS
BODY MASS INDEX: 36.85 KG/M2 | HEART RATE: 71 BPM | WEIGHT: 195.2 LBS | SYSTOLIC BLOOD PRESSURE: 130 MMHG | HEIGHT: 61 IN | DIASTOLIC BLOOD PRESSURE: 80 MMHG

## 2025-05-28 DIAGNOSIS — E66.812 OBESITY, CLASS II, BMI 35-39.9: ICD-10-CM

## 2025-05-28 PROCEDURE — 99213 OFFICE O/P EST LOW 20 MIN: CPT | Performed by: NURSE PRACTITIONER

## 2025-05-28 RX ORDER — PHENTERMINE HYDROCHLORIDE 37.5 MG/1
37.5 TABLET ORAL
Qty: 30 TABLET | Refills: 0 | Status: SHIPPED | OUTPATIENT
Start: 2025-05-28

## 2025-05-28 NOTE — PROGRESS NOTES
[facility]  [address] [place] [id number]    Office Note Follow Up      Date: 2025  Patient Name: Radha Grimm  MRN: [id number]  : 1975    Subjective     Chief Complaint  Obesity Management follow-up    Radha Grimm presents to [facility] for obesity management.     Patient is unsatisfied with weight loss progress. Appetite is moderately controlled. Reports no side effects of prescribed medications, phentermine. The patient is taking multivitamin and is taking fish oil.  The patient is not using a food journal.  The patient is exercising with a FITT score of:    Frequency Intensity Time Strength Training   []   0, none []   0 []   0 [x]   0   []   1 (1-2x/week) [x]   1 (light) []   1 (<10 min) []   1 (1x/week)   []   2 (3-5x/week) []   2 (moderate) [x]   2 (10-20 min) []   2 (2x/week)   [x]   3 (daily) []   3 (moderately hard)  []   4 (very hard) []   3 (20-30 min)  []   4 (>30 min) []   3 (3-4x/week)     Review of Systems   Constitutional:  Negative for appetite change and fatigue.   Eyes:  Negative for visual disturbance.   Cardiovascular:  Negative for chest pain and palpitations.   Gastrointestinal:  Negative for constipation and indigestion.   Neurological:  Negative for light-headedness.   All other systems reviewed and are negative.      Objective   Start weight: 214.8 pounds.    Total Loss lb/%Loss of beginning body weight (BBW): -19.6 lb/-9.12 %  Change in weight since last visit: -3.8     Recent Weight History:   Wt Readings from Last 6 Encounters:   25 88.5 kg (195 lb 3.2 oz)   25 90.3 kg (199 lb)   25 94.1 kg (207 lb 6.4 oz)   25 97.4 kg (214 lb 12.8 oz)   24 98.4 kg (217 lb)   11/15/24 97.1 kg (214 lb)         Body mass index is 37.49 kg/m².   Body composition analysis completed and showed:   Body Fat %: 40.3    Measurements (in inches)  Waist Circumference: 37.5    Vital Signs:   BP  "130/80 (BP Location: Left arm, Patient Position: Sitting)   Pulse 71   Ht 153.7 cm (60.5\")   Wt 88.5 kg (195 lb 3.2 oz)   BMI 37.49 kg/m²       Physical Exam  Constitutional:       Appearance: Normal appearance.   HENT:      Head: Normocephalic and atraumatic.   Pulmonary:      Effort: Pulmonary effort is normal.   Neurological:      Mental Status: She is alert and oriented to person, place, and time.   Psychiatric:         Mood and Affect: Mood normal.         Thought Content: Thought content normal.            Assessment / Plan        Diagnoses and all orders for this visit:    1. Obesity, Class II, BMI 35-39.9  Assessment & Plan:  Patient's (Body mass index is 37.49 kg/m².) indicates that they are obese (BMI >30) with health conditions that include hypertension, dyslipidemias, and osteoarthritis . Weight is improving with treatment. BMI  is above average; BMI management plan is completed. We discussed low calorie, low carb based diet program, portion control, increasing exercise, and pharmacologic options including phentermine .     I have instructed the patient to continue with pursuit of medical weight loss as a part of this program. Patient does meet criteria for use of anorectics at this time as BMI >30 .     The current plan for this month includes:   - Continue current exercise efforts  - It is appropriate to continue anorectic medications as prescribed at this time  >>Phentermine 37.5 mg daily  Continue sympathomimetic (medication refilled).  This patient 1) has no evidence of serious cardiovascular disease; 2) does not have serious psychiatric disease or a history of substance abuse; 3) has been informed about weight loss medications that are FDA approved for long-term use and told that these have been all documented to be safe and effective whereas phentermine has not; 4) does not demonstrate a clinically significant increase in pulse or blood pressure when taking phentermine; and 5) demonstrate " "significant weight loss while using the medication.  Patient understands that all antiobesity medications are contraindicated in pregnancy.  Patient denies a history of glaucoma.  Patient understands that long-term use of phentermine is considered off label use of this medication, however, that the endocrine Society and recent research supports that long-term use of phentermine does not appear to have detrimental health effects when used and the appropriate patient.  In addition, a 2019 study published in an obesity journal on [id number] patient's concluded that \"recommendations to limit phentermine to less than 3 months do not align with current concept of pharmacologic treatment of obesity\", and that \"long-term phentermine users experience greater weight loss without apparent increases in cardiovascular risk\".    I have discussed with the patient the use of using meds > 3 months is \"off label\" and we have discussed risks and benefits. I think it is medically reasonable to continue the medication as the patient has either lost > 5 %  Initial body weight. And is continuing with active weight loss, or is in taper maintenance plan. Patient has also reverified that intial history gathered is correct. Patient has been reminded about treatment plan, exit plan and controlled medication agreement form.    We reviewed potential side effects including insomnia, dry mouth, increased heart rate and blood pressure, increased anxiety.  We reviewed reducing caffeine consumption while taking phentermine.  especially if the patient is experience side effects.  The potential risk and benefits of phentermine have been reviewed with the patient, and alternative treatment options were discussed.  All questions were answered, and the patient wishes to move forward with this medication.    - Increase water to recommended daily amount  - Weight loss goal 4-6lbs this month  - Continue to prioritize protein, fiber, and hydration. "       Orders:  -     phentermine (ADIPEX-P) 37.5 MG tablet; Take 1 tablet by mouth Daily Before Lunch. Decongestants should be avoided while taking this medication.  Dispense: 30 tablet; Refill: 0        We discussed the risks, benefits, and limitations of treatments. Continue medications and OTC supplements as discussed. Patient verbalizes understanding of and agreement with management plan.     Follow Up   Return in about 4 weeks (around 6/25/2025).  Patient was given instructions and counseling regarding her condition or for health maintenance advice. Please see specific information pulled into the AVS if appropriate.         Rey Mendoza, APRN  05/28/2025

## 2025-05-29 NOTE — ASSESSMENT & PLAN NOTE
"Patient's (Body mass index is 37.49 kg/m².) indicates that they are obese (BMI >30) with health conditions that include hypertension, dyslipidemias, and osteoarthritis . Weight is improving with treatment. BMI  is above average; BMI management plan is completed. We discussed low calorie, low carb based diet program, portion control, increasing exercise, and pharmacologic options including phentermine .     I have instructed the patient to continue with pursuit of medical weight loss as a part of this program. Patient does meet criteria for use of anorectics at this time as BMI >30 .     The current plan for this month includes:   - Continue current exercise efforts  - It is appropriate to continue anorectic medications as prescribed at this time  >>Phentermine 37.5 mg daily  Continue sympathomimetic (medication refilled).  This patient 1) has no evidence of serious cardiovascular disease; 2) does not have serious psychiatric disease or a history of substance abuse; 3) has been informed about weight loss medications that are FDA approved for long-term use and told that these have been all documented to be safe and effective whereas phentermine has not; 4) does not demonstrate a clinically significant increase in pulse or blood pressure when taking phentermine; and 5) demonstrate significant weight loss while using the medication.  Patient understands that all antiobesity medications are contraindicated in pregnancy.  Patient denies a history of glaucoma.  Patient understands that long-term use of phentermine is considered off label use of this medication, however, that the endocrine Society and recent research supports that long-term use of phentermine does not appear to have detrimental health effects when used and the appropriate patient.  In addition, a 2019 study published in an obesity journal on [id number] patient's concluded that \"recommendations to limit phentermine to less than 3 months do not align with current " "concept of pharmacologic treatment of obesity\", and that \"long-term phentermine users experience greater weight loss without apparent increases in cardiovascular risk\".    I have discussed with the patient the use of using meds > 3 months is \"off label\" and we have discussed risks and benefits. I think it is medically reasonable to continue the medication as the patient has either lost > 5 %  Initial body weight. And is continuing with active weight loss, or is in taper maintenance plan. Patient has also reverified that intial history gathered is correct. Patient has been reminded about treatment plan, exit plan and controlled medication agreement form.    We reviewed potential side effects including insomnia, dry mouth, increased heart rate and blood pressure, increased anxiety.  We reviewed reducing caffeine consumption while taking phentermine.  especially if the patient is experience side effects.  The potential risk and benefits of phentermine have been reviewed with the patient, and alternative treatment options were discussed.  All questions were answered, and the patient wishes to move forward with this medication.    - Increase water to recommended daily amount  - Weight loss goal 4-6lbs this month  - Continue to prioritize protein, fiber, and hydration.     "

## 2025-07-01 ENCOUNTER — OFFICE VISIT (OUTPATIENT)
Age: 50
End: 2025-07-01
Payer: COMMERCIAL

## 2025-07-01 VITALS
WEIGHT: 201.4 LBS | HEIGHT: 61 IN | DIASTOLIC BLOOD PRESSURE: 82 MMHG | HEART RATE: 63 BPM | BODY MASS INDEX: 38.02 KG/M2 | SYSTOLIC BLOOD PRESSURE: 120 MMHG

## 2025-07-01 DIAGNOSIS — E66.812 OBESITY, CLASS II, BMI 35-39.9: Primary | ICD-10-CM

## 2025-07-01 PROCEDURE — 99214 OFFICE O/P EST MOD 30 MIN: CPT | Performed by: NURSE PRACTITIONER

## 2025-07-01 RX ORDER — BUPROPION HYDROCHLORIDE 150 MG/1
TABLET, EXTENDED RELEASE ORAL
Qty: 60 TABLET | Refills: 1 | Status: SHIPPED | OUTPATIENT
Start: 2025-07-01

## 2025-07-01 RX ORDER — NALTREXONE HYDROCHLORIDE 50 MG/1
TABLET, FILM COATED ORAL
Qty: 30 TABLET | Refills: 1 | Status: SHIPPED | OUTPATIENT
Start: 2025-07-01

## 2025-07-01 NOTE — ASSESSMENT & PLAN NOTE
Patient's (Body mass index is 38.69 kg/m².) indicates that they are obese (BMI >30) with health conditions that include hypertension, dyslipidemias, and osteoarthritis . Weight is worsening. BMI  is above average; BMI management plan is completed. We discussed low calorie, low carb based diet program, portion control, increasing exercise, and pharmacologic options including wellbutrin and naltrexone.    I have instructed the patient to continue with pursuit of medical weight loss as a part of this program. Patient does meet criteria for use of anorectics at this time as BMI >30 .     The current plan for this month includes:   - It is appropriate to continue anorectic medications as prescribed at this time  >>Discontinue phentermine (done)  >>Start wellbutrin and naltrexone. Dosing instructions, adverse effects and side effects discussed with patient  - Increase water to recommended daily amount  - Weight loss goal 4-6lbs this month  - Adjust macronutrients as discussed. Bring food journal to next visit for review

## 2025-07-01 NOTE — PROGRESS NOTES
[facility]  [address] [place] [id number]    Office Note Follow Up      Date: 2025  Patient Name: Radha Grimm  MRN: [id number]  : 1975    Subjective     Chief Complaint  Obesity Management follow-up    Radha Grimm presents to [facility] for obesity management.     Patient is unsatisfied with weight loss progress. Appetite is poorly controlled. Reports  side effects of insomnia from prescribed medications, phentermine and discontinued use about 3 weeks ago. The patient is taking multivitamin and is taking fish oil.  The patient is not using a food journal.  The patient is exercising with a FITT score of:    Frequency Intensity Time Strength Training   [x]   0, none [x]   0 [x]   0 [x]   0   []   1 (1-2x/week) []   1 (light) []   1 (<10 min) []   1 (1x/week)   []   2 (3-5x/week) []   2 (moderate) []   2 (10-20 min) []   2 (2x/week)   []   3 (daily) []   3 (moderately hard)  []   4 (very hard) []   3 (20-30 min)  []   4 (>30 min) []   3 (3-4x/week)     Review of Systems   Constitutional:  Negative for appetite change and fatigue.   Eyes:  Negative for visual disturbance.   Cardiovascular:  Negative for chest pain and palpitations.   Gastrointestinal:  Negative for constipation and indigestion.   Neurological:  Negative for light-headedness.   All other systems reviewed and are negative.      Objective   Start weight: 214.8 pounds.    Total Loss lb/%Loss of beginning body weight (BBW): -13.4 lb/-6.24 %  Change in weight since last visit: +6.2     Recent Weight History:   Wt Readings from Last 6 Encounters:   25 91.4 kg (201 lb 6.4 oz)   25 88.5 kg (195 lb 3.2 oz)   25 90.3 kg (199 lb)   25 94.1 kg (207 lb 6.4 oz)   25 97.4 kg (214 lb 12.8 oz)   24 98.4 kg (217 lb)         Body mass index is 38.69 kg/m².   Body composition analysis completed and showed:   Body Fat %: 40.3    Measurements (in  "inches)  Waist Circumference: 38.5    Vital Signs:   /82 (BP Location: Left arm, Patient Position: Sitting)   Pulse 63   Ht 153.7 cm (60.5\")   Wt 91.4 kg (201 lb 6.4 oz)   BMI 38.69 kg/m²       Physical Exam  Constitutional:       Appearance: Normal appearance.   HENT:      Head: Normocephalic and atraumatic.   Pulmonary:      Effort: Pulmonary effort is normal.   Neurological:      Mental Status: She is alert and oriented to person, place, and time.   Psychiatric:         Mood and Affect: Mood normal.         Thought Content: Thought content normal.               Assessment / Plan        Diagnoses and all orders for this visit:    1. Obesity, Class II, BMI 35-39.9 (Primary)  Assessment & Plan:  Patient's (Body mass index is 38.69 kg/m².) indicates that they are obese (BMI >30) with health conditions that include hypertension, dyslipidemias, and osteoarthritis . Weight is worsening. BMI  is above average; BMI management plan is completed. We discussed low calorie, low carb based diet program, portion control, increasing exercise, and pharmacologic options including wellbutrin and naltrexone.    I have instructed the patient to continue with pursuit of medical weight loss as a part of this program. Patient does meet criteria for use of anorectics at this time as BMI >30 .     The current plan for this month includes:   - It is appropriate to continue anorectic medications as prescribed at this time  >>Discontinue phentermine (done)  >>Start wellbutrin and naltrexone. Dosing instructions, adverse effects and side effects discussed with patient  - Increase water to recommended daily amount  - Weight loss goal 4-6lbs this month  - Adjust macronutrients as discussed. Bring food journal to next visit for review      Orders:  -     buPROPion SR (Wellbutrin SR) 150 MG 12 hr tablet; Take once daily for a week then increase to one tab twic daily  Dispense: 60 tablet; Refill: 1  -     naltrexone (DEPADE) 50 MG " tablet; Take 1/2 tablet for 7 days or until nausea subsides then increase to 1/2 tab bid.  Dispense: 30 tablet; Refill: 1        We discussed the risks, benefits, and limitations of treatments. Continue medications and OTC supplements as discussed. Patient verbalizes understanding of and agreement with management plan.     Follow Up   Return in about 4 weeks (around 7/29/2025).  Patient was given instructions and counseling regarding her condition or for health maintenance advice. Please see specific information pulled into the AVS if appropriate.     I spent 38 minutes on this date of service. This time includes time spent by me in the following activities:preparing for the visit, counseling and educating the patient/family/caregiver, ordering medications, tests, or procedures and documenting information in the medical record.    Rey Mendoza, JESSIE  07/01/2025

## 2025-08-05 ENCOUNTER — OFFICE VISIT (OUTPATIENT)
Age: 50
End: 2025-08-05
Payer: COMMERCIAL

## 2025-08-05 VITALS
HEART RATE: 86 BPM | HEIGHT: 61 IN | WEIGHT: 201.8 LBS | BODY MASS INDEX: 38.1 KG/M2 | SYSTOLIC BLOOD PRESSURE: 126 MMHG | DIASTOLIC BLOOD PRESSURE: 82 MMHG

## 2025-08-05 DIAGNOSIS — E66.812 OBESITY, CLASS II, BMI 35-39.9: Primary | ICD-10-CM

## 2025-08-05 PROCEDURE — 99213 OFFICE O/P EST LOW 20 MIN: CPT | Performed by: NURSE PRACTITIONER

## 2025-08-21 DIAGNOSIS — E66.812 OBESITY, CLASS II, BMI 35-39.9: ICD-10-CM

## 2025-08-22 RX ORDER — PHENTERMINE HYDROCHLORIDE 37.5 MG/1
37.5 TABLET ORAL
Qty: 30 TABLET | Refills: 0 | OUTPATIENT
Start: 2025-08-22